# Patient Record
Sex: FEMALE | Race: WHITE | NOT HISPANIC OR LATINO | Employment: FULL TIME | URBAN - METROPOLITAN AREA
[De-identification: names, ages, dates, MRNs, and addresses within clinical notes are randomized per-mention and may not be internally consistent; named-entity substitution may affect disease eponyms.]

---

## 2017-01-25 ENCOUNTER — HOSPITAL ENCOUNTER (OUTPATIENT)
Dept: RADIOLOGY | Facility: HOSPITAL | Age: 72
Discharge: HOME | End: 2017-01-25
Attending: FAMILY MEDICINE
Payer: OTHER MISCELLANEOUS

## 2017-01-25 ENCOUNTER — NURSE TRIAGE (OUTPATIENT)
Dept: ADMINISTRATIVE | Facility: CLINIC | Age: 72
End: 2017-01-25

## 2017-01-25 ENCOUNTER — OFFICE VISIT (OUTPATIENT)
Dept: FAMILY MEDICINE | Facility: CLINIC | Age: 72
End: 2017-01-25
Payer: COMMERCIAL

## 2017-01-25 ENCOUNTER — PATIENT MESSAGE (OUTPATIENT)
Dept: FAMILY MEDICINE | Facility: CLINIC | Age: 72
End: 2017-01-25

## 2017-01-25 VITALS
HEIGHT: 67 IN | TEMPERATURE: 99 F | BODY MASS INDEX: 21.28 KG/M2 | SYSTOLIC BLOOD PRESSURE: 112 MMHG | WEIGHT: 135.56 LBS | HEART RATE: 80 BPM | DIASTOLIC BLOOD PRESSURE: 62 MMHG

## 2017-01-25 DIAGNOSIS — T14.8XXA ABRASION: ICD-10-CM

## 2017-01-25 DIAGNOSIS — T14.90XA TRAUMA: ICD-10-CM

## 2017-01-25 DIAGNOSIS — T14.90XA TRAUMA: Primary | ICD-10-CM

## 2017-01-25 PROCEDURE — 73610 X-RAY EXAM OF ANKLE: CPT | Mod: TC,PO,RT

## 2017-01-25 PROCEDURE — 73630 X-RAY EXAM OF FOOT: CPT | Mod: TC,PO,RT

## 2017-01-25 PROCEDURE — 1125F AMNT PAIN NOTED PAIN PRSNT: CPT | Mod: S$GLB,,, | Performed by: FAMILY MEDICINE

## 2017-01-25 PROCEDURE — 73610 X-RAY EXAM OF ANKLE: CPT | Mod: 26,RT,, | Performed by: RADIOLOGY

## 2017-01-25 PROCEDURE — 99213 OFFICE O/P EST LOW 20 MIN: CPT | Mod: S$GLB,,, | Performed by: FAMILY MEDICINE

## 2017-01-25 PROCEDURE — 1159F MED LIST DOCD IN RCRD: CPT | Mod: S$GLB,,, | Performed by: FAMILY MEDICINE

## 2017-01-25 PROCEDURE — 1160F RVW MEDS BY RX/DR IN RCRD: CPT | Mod: S$GLB,,, | Performed by: FAMILY MEDICINE

## 2017-01-25 PROCEDURE — 73630 X-RAY EXAM OF FOOT: CPT | Mod: 26,RT,, | Performed by: RADIOLOGY

## 2017-01-25 PROCEDURE — 99999 PR PBB SHADOW E&M-EST. PATIENT-LVL III: CPT | Mod: PBBFAC,,, | Performed by: FAMILY MEDICINE

## 2017-01-25 PROCEDURE — 1157F ADVNC CARE PLAN IN RCRD: CPT | Mod: S$GLB,,, | Performed by: FAMILY MEDICINE

## 2017-01-25 RX ORDER — DICLOFENAC SODIUM 50 MG/1
50 TABLET, DELAYED RELEASE ORAL 2 TIMES DAILY PRN
Qty: 20 TABLET | Refills: 0 | Status: SHIPPED | OUTPATIENT
Start: 2017-01-25 | End: 2017-07-20

## 2017-01-25 RX ORDER — MUPIROCIN CALCIUM 20 MG/G
CREAM TOPICAL 2 TIMES DAILY PRN
Qty: 30 G | Refills: 1 | Status: SHIPPED | OUTPATIENT
Start: 2017-01-25 | End: 2017-02-04

## 2017-01-25 NOTE — PATIENT INSTRUCTIONS
RICE     Rest an injury, elevate it, and use ice and compression as directed.   RICE stands for rest, ice, compression, and elevation. These can limit pain and swelling after an injury. RICE may be recommended to help treat fractures, sprains, strains, and bruises or bumps.   Home care  The following explain the details of RICE:  · Rest. Limit the use of the injured body part. This helps prevent further damage to the body part and gives it time to heal. In some cases, you may need a sling, brace, splint, or cast to help keep the body part still until it has healed.  · Ice. Applying ice right after an injury helps relieve pain and swelling. Wrap a bag of ice in a thin towel. Then, place it over the injured area. Do this for 10 to 15 minutes every 3 to 4 hours. Continue for the next 1 to 3 days or until your symptoms improve. Never put ice directly on your skin or ice an area longer than 15 minutes at a time.  · Compression. Putting pressure on an injury helps reduce swelling and provides support. Wrap the injured area firmly with an elastic bandage/wrap. Make sure not to wrap the bandage too tightly or you will cut off blood flow to the injured area. If your bandage loosens, rewrap it.  · Elevation. Keeping an injury raised above the level of your heart reduces swelling, pain, and throbbing. For instance, if you have a broken leg, it may help to rest your leg on several pillows when sitting or lying down. Try to keep the injured area elevated for at least 2 to 3 hours per day.  Follow-up care  Follow up with your healthcare provider, or as advised.  When to seek medical advice  Call your healthcare provider right away if any of these occur:  · Fever of 100.4°F (38°C) or higher, or as directed by your healthcare provider  · Increased pain or swelling in the injured body part  · Injured body part becomes cold, blue, numb, or tingly  · Signs of infection. These include warmth in the skin, redness, drainage, or bad  smell coming from the injured body part.  © 3969-4235 [employer]. [address] [place] [address], [place] [address]. All rights reserved. This information is not intended as a substitute for professional medical care. Always follow your healthcare professional's instructions.

## 2017-01-25 NOTE — MR AVS SNAPSHOT
[facility]  [address] [facility], [address]  [facility] [address]  Phone: [phone]  Fax: [phone]                  La Salgado   2017 9:40 AM   Office Visit    Description:  Female : 1945   Provider:  Sandra Bush MD   Department:  [facility]           Reason for Visit     Foot Injury           Diagnoses this Visit        Comments    Trauma    -  Primary     Abrasion                To Do List           Future Appointments        Provider Department Dept Phone    2017 8:20 AM Deepak Wilson MD [facility] [phone]    2017 10:40 AM Yelitza Mullen MD [place] - Dermatology [phone]      Goals (5 Years of Data)     None       These Medications        Disp Refills Start End    mupirocin calcium 2% (BACTROBAN) 2 % cream 30 g 1 2017    Apply topically 2 (two) times daily as needed. - Topical (Top)    Pharmacy: [facility] [address] [place] [address] [address] [facility] AT [facility] & [address] Ph #: [phone]       diclofenac (VOLTAREN) 50 MG EC tablet 20 tablet 0 2017     Take 1 tablet (50 mg total) by mouth 2 (two) times daily as needed. - Oral    Pharmacy: [facility] [place] [address] [address] [facility] AT [facility] & [address] Ph #: [phone]         [facility] On Call     [facility] On [facility] Nurse Care Line -  Assistance  Registered nurses in the [facility] Center provide clinical advisement, health education, appointment booking, and other advisory services.  Call for this free service at [phone].             Medications           Message regarding Medications     Verify the changes and/or additions to your medication regime listed below are the same as discussed with your clinician today.  If any of these changes or additions are incorrect, please notify your healthcare provider.        START taking these NEW  "medications        Refills    mupirocin calcium 2% (BACTROBAN) 2 % cream 1    Sig: Apply topically 2 (two) times daily as needed.    Class: Normal    Route: Topical (Top)    diclofenac (VOLTAREN) 50 MG EC tablet 0    Sig: Take 1 tablet (50 mg total) by mouth 2 (two) times daily as needed.    Class: Normal    Route: Oral      STOP taking these medications     FLUZONE HIGH-DOSE 2015-16, PF, 180 mcg/0.5 mL Syrg     peg 3350-electrolytes-vit C (MOVIPREP) 100-7.5-2.691 gram PwPk Take 1 packet by mouth as directed.           Verify that the below list of medications is an accurate representation of the medications you are currently taking.  If none reported, the list may be blank. If incorrect, please contact your healthcare provider. Carry this list with you in case of emergency.           Current Medications     diclofenac (VOLTAREN) 50 MG EC tablet Take 1 tablet (50 mg total) by mouth 2 (two) times daily as needed.    mupirocin calcium 2% (BACTROBAN) 2 % cream Apply topically 2 (two) times daily as needed.           Clinical Reference Information           Vital Signs - Last Recorded  Most recent update: 1/25/2017  9:40 AM by Jodie Amaya MA    BP Pulse Temp Ht Wt BMI    112/62 (BP Location: Right arm) 80 98.9 °F (37.2 °C) 5' 7" (1.702 m) 61.5 kg (135 lb 9.3 oz) 21.24 kg/m2      Blood Pressure          Most Recent Value    BP  112/62      Allergies as of 1/25/2017     Amoxicillin    Codeine    Risedronate    Sulfa (Sulfonamide Antibiotics)      Immunizations Administered on Date of Encounter - 1/25/2017     None      Orders Placed During Today's Visit     Future Labs/Procedures Expected by Expires    X-Ray Ankle Complete Right  1/25/2017 1/25/2018    X-Ray Foot Complete Right  1/25/2017 1/25/2018      Instructions      RICE     Rest an injury, elevate it, and use ice and compression as directed.   RICE stands for rest, ice, compression, and elevation. These can limit pain and swelling after an injury. RICE may be " recommended to help treat fractures, sprains, strains, and bruises or bumps.   Home care  The following explain the details of RICE:  · Rest. Limit the use of the injured body part. This helps prevent further damage to the body part and gives it time to heal. In some cases, you may need a sling, brace, splint, or cast to help keep the body part still until it has healed.  · Ice. Applying ice right after an injury helps relieve pain and swelling. Wrap a bag of ice in a thin towel. Then, place it over the injured area. Do this for 10 to 15 minutes every 3 to 4 hours. Continue for the next 1 to 3 days or until your symptoms improve. Never put ice directly on your skin or ice an area longer than 15 minutes at a time.  · Compression. Putting pressure on an injury helps reduce swelling and provides support. Wrap the injured area firmly with an elastic bandage/wrap. Make sure not to wrap the bandage too tightly or you will cut off blood flow to the injured area. If your bandage loosens, rewrap it.  · Elevation. Keeping an injury raised above the level of your heart reduces swelling, pain, and throbbing. For instance, if you have a broken leg, it may help to rest your leg on several pillows when sitting or lying down. Try to keep the injured area elevated for at least 2 to 3 hours per day.  Follow-up care  Follow up with your healthcare provider, or as advised.  When to seek medical advice  Call your healthcare provider right away if any of these occur:  · Fever of 100.4°F (38°C) or higher, or as directed by your healthcare provider  · Increased pain or swelling in the injured body part  · Injured body part becomes cold, blue, numb, or tingly  · Signs of infection. These include warmth in the skin, redness, drainage, or bad smell coming from the injured body part.  © 3532-0183 [employer]. [address] [place] [address], [place] [address]. All rights reserved. This information is not intended as a substitute for  professional medical care. Always follow your healthcare professional's instructions.

## 2017-01-25 NOTE — TELEPHONE ENCOUNTER
Reason for Disposition   [1] Limp when walking AND [2] due to a direct blow or crushing injury    Protocols used: ST FOOT AND ANKLE INJURY-A-    Patient reporting she had a projector from the ceiling at her work fall onto the achilles area of her right foot but today she is stating that she is having a significant amount of pain to the top of that foot and it is painful for her to walk. She states she can walk on it for a little while and then she starts having significant pain to that foot. Advised to be seen today. Scheduled appointment. Please contact caller with any further care advice.

## 2017-01-26 ENCOUNTER — TELEPHONE (OUTPATIENT)
Dept: FAMILY MEDICINE | Facility: CLINIC | Age: 72
End: 2017-01-26

## 2017-01-26 NOTE — TELEPHONE ENCOUNTER
----- Message from Linda Cunningham sent at 1/26/2017  8:23 AM CST -----  Contact: Pt - [phone]  Pt had an xray done at [facility] yesterday on her foot and would like to get the results of that xray.  Please call pt La Salgado @ [phone]

## 2017-01-27 NOTE — PROGRESS NOTES
Subjective:       Patient ID: La Salgado is a 71 y.o. female.    Chief Complaint: Foot Injury (heal right foot)  Projector screen fell out of the ceiling and hit the back of her ankles  Patient suffered abrasions to the back of both heels.  But is now experiencing pain on the dorsum of the   Right foot and ankle area.  Patient reports no other injuries  HPI see above   Review of Systems   Constitutional: Negative.    HENT: Negative.    Eyes: Negative.    Respiratory: Negative.    Cardiovascular: Negative.    Gastrointestinal: Negative.    Endocrine: Negative.    Genitourinary: Negative.    Musculoskeletal: Positive for arthralgias.   Skin: Positive for wound.   Allergic/Immunologic: Negative.    Neurological: Negative.    Hematological: Negative.    Psychiatric/Behavioral: Negative.        Objective:      Physical Exam   Constitutional: She is oriented to person, place, and time. She appears well-developed and well-nourished. No distress.   HENT:   Head: Normocephalic and atraumatic.   Eyes: Conjunctivae and EOM are normal. Pupils are equal, round, and reactive to light.   Musculoskeletal:        Right ankle: She exhibits decreased range of motion, swelling and ecchymosis.        Left ankle: Normal.        Right foot: There is decreased range of motion, tenderness and swelling.        Left foot: Normal.        Feet:    Demarcated areas show areas of point tenderness on exam   Neurological: She is alert and oriented to person, place, and time. She has normal reflexes. She displays normal reflexes. No cranial nerve deficit. She exhibits normal muscle tone. Coordination normal.   Skin: Skin is warm and dry. Abrasion noted. She is not diaphoretic. No pallor.        Markings on the diagram represent superficial abrasions on the back of both heels healing   Psychiatric: She has a normal mood and affect. Her behavior is normal. Judgment and thought content normal.   Nursing note and vitals reviewed.      Assessment:        1. Trauma    2. Abrasion     3.     Foot pain  Plan:      fresh dressings were applied to the wound is scheduled for x-ray to rule out fractures because of trauma  Refer to the med card dated 1/25/2017 we'll contact the patient results of x-rays are available  We'll make further determinations at that time depending on the results.

## 2017-02-01 ENCOUNTER — OFFICE VISIT (OUTPATIENT)
Dept: FAMILY MEDICINE | Facility: CLINIC | Age: 72
End: 2017-02-01
Payer: COMMERCIAL

## 2017-02-01 ENCOUNTER — LAB VISIT (OUTPATIENT)
Dept: LAB | Facility: HOSPITAL | Age: 72
End: 2017-02-01
Attending: FAMILY MEDICINE
Payer: COMMERCIAL

## 2017-02-01 VITALS
HEART RATE: 76 BPM | HEIGHT: 66 IN | TEMPERATURE: 98 F | SYSTOLIC BLOOD PRESSURE: 110 MMHG | BODY MASS INDEX: 21.44 KG/M2 | WEIGHT: 133.38 LBS | DIASTOLIC BLOOD PRESSURE: 64 MMHG

## 2017-02-01 DIAGNOSIS — Z12.31 SCREENING MAMMOGRAM FOR HIGH-RISK PATIENT: ICD-10-CM

## 2017-02-01 DIAGNOSIS — Z00.00 ROUTINE GENERAL MEDICAL EXAMINATION AT A HEALTH CARE FACILITY: ICD-10-CM

## 2017-02-01 DIAGNOSIS — S91.001S ANKLE WOUND, RIGHT, SEQUELA: Primary | ICD-10-CM

## 2017-02-01 DIAGNOSIS — Z13.9 SCREENING: ICD-10-CM

## 2017-02-01 LAB
ALBUMIN SERPL BCP-MCNC: 3.7 G/DL
ALP SERPL-CCNC: 100 U/L
ALT SERPL W/O P-5'-P-CCNC: 22 U/L
ANION GAP SERPL CALC-SCNC: 10 MMOL/L
AST SERPL-CCNC: 26 U/L
BASOPHILS # BLD AUTO: 0.01 K/UL
BASOPHILS NFR BLD: 0.2 %
BILIRUB SERPL-MCNC: 0.7 MG/DL
BILIRUB UR QL STRIP: NEGATIVE
BUN SERPL-MCNC: 21 MG/DL
CALCIUM SERPL-MCNC: 9.2 MG/DL
CHLORIDE SERPL-SCNC: 104 MMOL/L
CHOLEST/HDLC SERPL: 4.8 {RATIO}
CLARITY UR REFRACT.AUTO: CLEAR
CO2 SERPL-SCNC: 24 MMOL/L
COLOR UR AUTO: ABNORMAL
CREAT SERPL-MCNC: 1 MG/DL
DIFFERENTIAL METHOD: NORMAL
EOSINOPHIL # BLD AUTO: 0.1 K/UL
EOSINOPHIL NFR BLD: 1 %
ERYTHROCYTE [DISTWIDTH] IN BLOOD BY AUTOMATED COUNT: 14 %
EST. GFR  (AFRICAN AMERICAN): >60 ML/MIN/1.73 M^2
EST. GFR  (NON AFRICAN AMERICAN): 57.6 ML/MIN/1.73 M^2
GLUCOSE SERPL-MCNC: 79 MG/DL
GLUCOSE UR QL STRIP: NEGATIVE
HCT VFR BLD AUTO: 38.7 %
HDL/CHOLESTEROL RATIO: 21 %
HDLC SERPL-MCNC: 229 MG/DL
HDLC SERPL-MCNC: 48 MG/DL
HGB BLD-MCNC: 12.9 G/DL
HGB UR QL STRIP: NEGATIVE
KETONES UR QL STRIP: NEGATIVE
LDLC SERPL CALC-MCNC: 164.8 MG/DL
LEUKOCYTE ESTERASE UR QL STRIP: NEGATIVE
LYMPHOCYTES # BLD AUTO: 1.3 K/UL
LYMPHOCYTES NFR BLD: 27.6 %
MCH RBC QN AUTO: 27.9 PG
MCHC RBC AUTO-ENTMCNC: 33.3 %
MCV RBC AUTO: 84 FL
MONOCYTES # BLD AUTO: 0.7 K/UL
MONOCYTES NFR BLD: 14.6 %
NEUTROPHILS # BLD AUTO: 2.7 K/UL
NEUTROPHILS NFR BLD: 56.4 %
NITRITE UR QL STRIP: NEGATIVE
NONHDLC SERPL-MCNC: 181 MG/DL
PH UR STRIP: 5 [PH] (ref 5–8)
PLATELET # BLD AUTO: 251 K/UL
PMV BLD AUTO: 9.6 FL
POTASSIUM SERPL-SCNC: 4.5 MMOL/L
PROT SERPL-MCNC: 7.5 G/DL
PROT UR QL STRIP: NEGATIVE
RBC # BLD AUTO: 4.62 M/UL
SODIUM SERPL-SCNC: 138 MMOL/L
SP GR UR STRIP: 1.02 (ref 1–1.03)
TRIGL SERPL-MCNC: 81 MG/DL
TSH SERPL DL<=0.005 MIU/L-ACNC: 2.59 UIU/ML
URN SPEC COLLECT METH UR: ABNORMAL
UROBILINOGEN UR STRIP-ACNC: NEGATIVE EU/DL
WBC # BLD AUTO: 4.78 K/UL

## 2017-02-01 PROCEDURE — 80053 COMPREHEN METABOLIC PANEL: CPT

## 2017-02-01 PROCEDURE — 81003 URINALYSIS AUTO W/O SCOPE: CPT

## 2017-02-01 PROCEDURE — 80061 LIPID PANEL: CPT

## 2017-02-01 PROCEDURE — 99397 PER PM REEVAL EST PAT 65+ YR: CPT | Mod: S$GLB,,, | Performed by: FAMILY MEDICINE

## 2017-02-01 PROCEDURE — 99999 PR PBB SHADOW E&M-EST. PATIENT-LVL III: CPT | Mod: PBBFAC,,, | Performed by: FAMILY MEDICINE

## 2017-02-01 PROCEDURE — 84443 ASSAY THYROID STIM HORMONE: CPT

## 2017-02-01 PROCEDURE — 36415 COLL VENOUS BLD VENIPUNCTURE: CPT | Mod: PO

## 2017-02-01 PROCEDURE — 85025 COMPLETE CBC W/AUTO DIFF WBC: CPT

## 2017-02-01 RX ORDER — CLINDAMYCIN HYDROCHLORIDE 300 MG/1
300 CAPSULE ORAL 3 TIMES DAILY
Qty: 30 CAPSULE | Refills: 0 | Status: SHIPPED | OUTPATIENT
Start: 2017-02-01 | End: 2017-02-11

## 2017-02-01 NOTE — MR AVS SNAPSHOT
[facility]  [address] [facility], [address]  [facility] [address]  Phone: [phone]  Fax: [phone]                  La Salgado   2017 8:20 AM   Office Visit    Description:  Female : 1945   Provider:  Deepak Wilson MD   Department:  [facility]           Reason for Visit     Annual Exam           Diagnoses this Visit        Comments    Ankle wound, right, sequela    -  Primary     Routine general medical examination at a health care facility         Screening mammogram for high-risk patient         Screening                To Do List           Future Appointments        Provider Department Dept Phone    2017 10:40 AM Yelitza Mullen MD [place] - Dermatology [phone]      Goals (5 Years of Data)     None      Follow-Up and Disposition     Return in about 2 days (around 2/3/2017).       These Medications        Disp Refills Start End    clindamycin (CLEOCIN) 300 MG capsule 30 capsule 0 2017    Take 1 capsule (300 mg total) by mouth 3 (three) times daily. - Oral    Pharmacy: [facility] [address] [place] [address] [address] [facility] AT [facility] & [address] Ph #: [phone]         [facility] On Call     [facility] On Call Nurse Care Line -  Assistance  Registered nurses in the [facility] Center provide clinical advisement, health education, appointment booking, and other advisory services.  Call for this free service at [phone].             Medications           Message regarding Medications     Verify the changes and/or additions to your medication regime listed below are the same as discussed with your clinician today.  If any of these changes or additions are incorrect, please notify your healthcare provider.        START taking these NEW medications        Refills    clindamycin (CLEOCIN) 300 MG capsule 0    Sig: Take 1 capsule (300 mg total) by mouth 3 (three) times daily.    Class:  "Normal    Route: Oral           Verify that the below list of medications is an accurate representation of the medications you are currently taking.  If none reported, the list may be blank. If incorrect, please contact your healthcare provider. Carry this list with you in case of emergency.           Current Medications     diclofenac (VOLTAREN) 50 MG EC tablet Take 1 tablet (50 mg total) by mouth 2 (two) times daily as needed.    mupirocin calcium 2% (BACTROBAN) 2 % cream Apply topically 2 (two) times daily as needed.    clindamycin (CLEOCIN) 300 MG capsule Take 1 capsule (300 mg total) by mouth 3 (three) times daily.           Clinical Reference Information           Vital Signs - Last Recorded  Most recent update: 2/1/2017  8:34 AM by Laura Mcneill MA    BP Pulse Temp    110/64 (BP Location: Right arm, Patient Position: Sitting, BP Method: Manual) 76 97.8 °F (36.6 °C) (Oral)    Ht Wt BMI    5' 6.25" (1.683 m) 60.5 kg (133 lb 6.1 oz) 21.37 kg/m2      Blood Pressure          Most Recent Value    BP  110/64      Allergies as of 2/1/2017     Amoxicillin    Codeine    Risedronate    Sulfa (Sulfonamide Antibiotics)      Immunizations Administered on Date of Encounter - 2/1/2017     None      Orders Placed During Today's Visit      Normal Orders This Visit    Ambulatory consult to Wound Clinic     Future Labs/Procedures Expected by Expires    CBC auto differential  2/1/2017 5/2/2017    Comprehensive metabolic panel  2/1/2017 5/2/2017    DXA Bone Density Spine And Hip_Axial Skeleton  2/1/2017 5/2/2017    Lipid panel  2/1/2017 4/2/2018    Mammo Digital Screening Bilat with CAD  2/1/2017 5/2/2017    TSH  2/1/2017 4/2/2018    Urinalysis  2/1/2017 5/2/2017      "

## 2017-02-01 NOTE — PROGRESS NOTES
Subjective:       Patient ID: La Salgado is a 71 y.o. female.    Chief Complaint: Annual Exam    HPI Comments: Disclaimer: This note has been generated using voice-recognition software. There may be typographical errors that have been missed during proof-reading    Here for routine exam, last exam one year ago  Last mammogram:  12/15  Last BMD:  2013  Last colonoscopy: 2016, removal of polypsX2  Immunizations:  UTD    Review of Systems   Constitutional: Negative for activity change, appetite change, chills, fatigue, fever and unexpected weight change.   HENT: Negative for congestion, ear discharge, ear pain, hearing loss and sinus pressure.    Eyes: Negative for pain and visual disturbance.   Respiratory: Negative for cough, chest tightness and shortness of breath.    Cardiovascular: Negative for chest pain, palpitations and leg swelling.   Gastrointestinal: Negative for abdominal distention and abdominal pain.   Genitourinary: Negative for difficulty urinating, dysuria, frequency and hematuria.   Musculoskeletal: Negative for arthralgias, back pain, gait problem, joint swelling, myalgias, neck pain and neck stiffness.   Skin: Positive for color change and wound. Negative for rash.        Recently seen for trauma over right heel/Achilles area.  Now with swelling and redness over site   Neurological: Negative for dizziness, tremors, speech difficulty, weakness, numbness and headaches.   Psychiatric/Behavioral: Negative for agitation and behavioral problems.       Objective:      Physical Exam   Constitutional: She is oriented to person, place, and time. She appears well-developed and well-nourished.   HENT:   Right Ear: Tympanic membrane and external ear normal.   Left Ear: Tympanic membrane and external ear normal.   Nose: Nose normal.   Mouth/Throat: Oropharynx is clear and moist.   Eyes: Conjunctivae and EOM are normal. Pupils are equal, round, and reactive to light.   Neck: Normal range of motion. Neck  supple. No tracheal deviation present. No thyromegaly present.   Cardiovascular: Normal rate, regular rhythm, normal heart sounds and intact distal pulses.    Pulmonary/Chest: Effort normal. She has no wheezes. She has no rales. She exhibits no tenderness.   Abdominal: Soft. Bowel sounds are normal. She exhibits no distension and no mass. There is no rebound.   Genitourinary:   Genitourinary Comments: Breasts show no masses or nipple retraction, no axillary adenopathy     Musculoskeletal: Normal range of motion. She exhibits no edema or tenderness.   Lymphadenopathy:     She has no cervical adenopathy.   Neurological: She is alert and oriented to person, place, and time. She has normal reflexes. No cranial nerve deficit. Coordination normal.   Skin: Skin is warm and dry. No rash noted. No erythema.   Right ankle exam shows a 2cm ulcerated lesion over lateral aspect of ankle above lateral malleolus.  Large area of erythema/induration measuring about 5x8cm no fluctuance   Psychiatric: She has a normal mood and affect. Her behavior is normal.       Assessment:       1.  Physical exam  2.  Cellulitis, ulcer right ankle  Plan:       1.  Fasting labs, mammogram, UA, BMD  2.  Clindamycin 300mg tid with follow up in 2 days  3.  Consult Wound Clinic

## 2017-02-02 ENCOUNTER — PATIENT MESSAGE (OUTPATIENT)
Dept: FAMILY MEDICINE | Facility: CLINIC | Age: 72
End: 2017-02-02

## 2017-02-03 ENCOUNTER — OFFICE VISIT (OUTPATIENT)
Dept: FAMILY MEDICINE | Facility: CLINIC | Age: 72
End: 2017-02-03
Payer: COMMERCIAL

## 2017-02-03 VITALS — TEMPERATURE: 99 F | HEART RATE: 84 BPM | DIASTOLIC BLOOD PRESSURE: 70 MMHG | SYSTOLIC BLOOD PRESSURE: 100 MMHG

## 2017-02-03 DIAGNOSIS — L03.90 WOUND CELLULITIS: Primary | ICD-10-CM

## 2017-02-03 PROCEDURE — 99214 OFFICE O/P EST MOD 30 MIN: CPT | Mod: S$GLB,,, | Performed by: FAMILY MEDICINE

## 2017-02-03 PROCEDURE — 1125F AMNT PAIN NOTED PAIN PRSNT: CPT | Mod: S$GLB,,, | Performed by: FAMILY MEDICINE

## 2017-02-03 PROCEDURE — 1160F RVW MEDS BY RX/DR IN RCRD: CPT | Mod: S$GLB,,, | Performed by: FAMILY MEDICINE

## 2017-02-03 PROCEDURE — 1157F ADVNC CARE PLAN IN RCRD: CPT | Mod: S$GLB,,, | Performed by: FAMILY MEDICINE

## 2017-02-03 PROCEDURE — 99999 PR PBB SHADOW E&M-EST. PATIENT-LVL III: CPT | Mod: PBBFAC,,, | Performed by: FAMILY MEDICINE

## 2017-02-03 PROCEDURE — 1159F MED LIST DOCD IN RCRD: CPT | Mod: S$GLB,,, | Performed by: FAMILY MEDICINE

## 2017-02-03 RX ORDER — CHLORHEXIDINE GLUCONATE 40 MG/ML
SOLUTION TOPICAL DAILY PRN
Qty: 236 ML | Refills: 2 | COMMUNITY
Start: 2017-02-03 | End: 2017-07-20

## 2017-02-03 NOTE — PROGRESS NOTES
Subjective:       Patient ID: La Salgado is a 71 y.o. female.    Chief Complaint: Wound Check (right foot)  pt suffered a large abrasion to the back of her right heel that resulted in cellulitis  She has been applying clean bandaging and taking po antibiotics for the wound  She is in for a wound check today.  HPI see above  Review of Systems   Constitutional: Negative.    HENT: Negative.    Eyes: Negative.    Respiratory: Negative.    Cardiovascular: Negative.    Gastrointestinal: Negative.    Endocrine: Negative.    Genitourinary: Negative.    Musculoskeletal: Positive for arthralgias.   Skin: Positive for color change and wound.   Allergic/Immunologic: Negative.    Neurological: Negative.    Hematological: Negative.    Psychiatric/Behavioral: Negative.        Objective:      Physical Exam   Constitutional: She is oriented to person, place, and time.   HENT:   Head: Normocephalic and atraumatic.   Eyes: Conjunctivae and EOM are normal. Pupils are equal, round, and reactive to light.   Neck: Normal range of motion. Neck supple. No thyromegaly present.   Cardiovascular: Normal rate and regular rhythm.    Pulmonary/Chest: Effort normal and breath sounds normal.   Neurological: She is alert and oriented to person, place, and time. She has normal reflexes.   Skin: Skin is warm and dry. Bruising noted. There is erythema.        Pt 's erythema is clearly inside the black margins.  Wound is healing well.   Psychiatric: She has a normal mood and affect. Her behavior is normal. Judgment and thought content normal.   Nursing note and vitals reviewed.      Assessment:       1. Wound cellulitis    2.      Good wound healing  Plan:     dressing change performed with telfa and occlusive bandage.  Pt will continue same antibiotics which appear to be helping the cellulitis.  Pt will f/u with wound care nurse as scheduled. Pt will call if wound starts to   Get soupy or more erythema

## 2017-02-03 NOTE — MR AVS SNAPSHOT
[facility]  [address] [facility], [address]  [facility] [address]  Phone: [phone]  Fax: [phone]                  La Salgado   2/3/2017 9:20 AM   Office Visit    Description:  Female : 1945   Provider:  Sandra Bush MD   Department:  [facility]           Reason for Visit     Wound Check           Diagnoses this Visit        Comments    Wound cellulitis    -  Primary            To Do List           Future Appointments        Provider Department Dept Phone    2017 1:00 PM Christianne Perez, [facility] W[facility] Care [phone]    2017 2:00 PM METH MAMMO1 [facility] [phone]    2017 3:00 PM METC, DEXA1 [facility] [phone]    2017 10:40 AM Yelitza Mullen MD [facility] Dermatology [phone]      Goals (5 Years of Data)     None      PURCHASE these Medications (No prescription required)        Start End    chlorhexidine (HIBICLENS) 4 % external liquid 2/3/2017     Sig - Route: Apply topically daily as needed. - Topical (Top)    Class: OTC      [facility]     [facility] -  Assistance  Registered nurses in the [facility] provide clinical advisement, health education, appointment booking, and other advisory services.  Call for this free service at [phone].             Medications           Message regarding Medications     Verify the changes and/or additions to your medication regime listed below are the same as discussed with your clinician today.  If any of these changes or additions are incorrect, please notify your healthcare provider.        START taking these NEW medications        Refills    chlorhexidine (HIBICLENS) 4 % external liquid 2    Sig: Apply topically daily as needed.    Class: OTC    Route: Topical (Top)           Verify that the below list of medications is an accurate representation of the medications you are  currently taking.  If none reported, the list may be blank. If incorrect, please contact your healthcare provider. Carry this list with you in case of emergency.           Current Medications     chlorhexidine (HIBICLENS) 4 % external liquid Apply topically daily as needed.    clindamycin (CLEOCIN) 300 MG capsule Take 1 capsule (300 mg total) by mouth 3 (three) times daily.    diclofenac (VOLTAREN) 50 MG EC tablet Take 1 tablet (50 mg total) by mouth 2 (two) times daily as needed.    mupirocin calcium 2% (BACTROBAN) 2 % cream Apply topically 2 (two) times daily as needed.           Clinical Reference Information           Your Vitals Were     BP Pulse Temp             100/70 (BP Location: Left arm) 84 99.2 °F (37.3 °C)         Blood Pressure          Most Recent Value    BP  100/70      Allergies as of 2/3/2017     Amoxicillin    Codeine    Risedronate    Sulfa (Sulfonamide Antibiotics)      Immunizations Administered on Date of Encounter - 2/3/2017     None      Language Assistance Services     ATTENTION: Language assistance services are available, free of charge. Please call [phone].      ATENCIÓN: Si habla opal, tiene a tapia disposición servicios gratuitos de asistencia lingüística. Llame al [phone].     KIMBERLY Ý: N?u b?n nói Ti?ng Vi?t, có các d?ch v? h? tr? ngôn ng? mi?n phí dành cho b?n. G?i s? [phone].         [facility] complies with applicable Federal civil rights laws and does not discriminate on the basis of race, color, national origin, age, disability, or sex.

## 2017-02-07 ENCOUNTER — OFFICE VISIT (OUTPATIENT)
Dept: WOUND CARE | Facility: CLINIC | Age: 72
End: 2017-02-07
Payer: COMMERCIAL

## 2017-02-07 VITALS
HEART RATE: 68 BPM | TEMPERATURE: 98 F | SYSTOLIC BLOOD PRESSURE: 103 MMHG | BODY MASS INDEX: 21.67 KG/M2 | DIASTOLIC BLOOD PRESSURE: 53 MMHG | HEIGHT: 66 IN | WEIGHT: 134.81 LBS

## 2017-02-07 DIAGNOSIS — S81.801A OPEN WOUND OF LOWER LEG WITH COMPLICATION, RIGHT, INITIAL ENCOUNTER: ICD-10-CM

## 2017-02-07 PROBLEM — S81.809A OPEN WOUND OF LOWER LEG WITH COMPLICATION: Status: ACTIVE | Noted: 2017-02-07

## 2017-02-07 PROCEDURE — 99999 PR PBB SHADOW E&M-EST. PATIENT-LVL III: CPT | Mod: PBBFAC,,, | Performed by: NURSE PRACTITIONER

## 2017-02-07 PROCEDURE — 1160F RVW MEDS BY RX/DR IN RCRD: CPT | Mod: S$GLB,ICN,, | Performed by: NURSE PRACTITIONER

## 2017-02-07 PROCEDURE — 1159F MED LIST DOCD IN RCRD: CPT | Mod: S$GLB,ICN,, | Performed by: NURSE PRACTITIONER

## 2017-02-07 PROCEDURE — 1157F ADVNC CARE PLAN IN RCRD: CPT | Mod: S$GLB,ICN,, | Performed by: NURSE PRACTITIONER

## 2017-02-07 PROCEDURE — 99203 OFFICE O/P NEW LOW 30 MIN: CPT | Mod: S$GLB,ICN,, | Performed by: NURSE PRACTITIONER

## 2017-02-07 PROCEDURE — 1126F AMNT PAIN NOTED NONE PRSNT: CPT | Mod: S$GLB,ICN,, | Performed by: NURSE PRACTITIONER

## 2017-02-07 NOTE — LETTER
February 7, 2017      Deepak Wilson MD  [address] [facility]  [address]  [facility] [address]           [facility]  [address]  [facility] [address]  Phone: [phone]          Patient: La Salgado   MR Number: [id number]   YOB: 1945   Date of Visit: 2/7/2017       Dear Dr. Deepak Wilson:    Thank you for referring La Salgado to me for evaluation. Attached you will find relevant portions of my assessment and plan of care.    If you have questions, please do not hesitate to call me. I look forward to following La Salgado along with you.    Sincerely,    Christianne Perez, JACINTO    Enclosure  CC:  No Recipients    If you would like to receive this communication electronically, please contact [email or web address] or [phone] to request more information on No [employer] Just V[employer] Link access.    For providers and/or their staff who would like to refer a patient to [facility], please contact us through our one-stop-shop provider referral line, [facility], at [phone].    If you feel you have received this communication in error or would no longer like to receive these types of communications, please e-mail [email or web address]

## 2017-02-07 NOTE — PROGRESS NOTES
Subjective:       Patient ID: La Salgado is a 71 y.o. female.    Chief Complaint: Wound Check    HPI   This is a 71 year old female referred by Dr. Wilson for evaluation and management of a traumatic wound to the right heel.  She is a professor at [employer].  Two weeks ago she was pulling down an AV screen for a presentation when it fell off the wall incurring injury to the right posterior heel.  She is afebrile. She is on clindamycin orally and using bactroban on the wound.  The redness is improved but the wound does not seem to be healing.  She does not complain of pain.  Review of Systems   Constitutional: Negative for chills, diaphoresis and fever.   HENT: Negative for hearing loss, postnasal drip, rhinorrhea, sinus pressure, sneezing, sore throat, tinnitus and trouble swallowing.    Eyes: Negative for visual disturbance.   Respiratory: Negative for apnea, cough, shortness of breath and wheezing.    Cardiovascular: Positive for leg swelling. Negative for chest pain and palpitations.   Gastrointestinal: Negative for constipation, diarrhea, nausea and vomiting.   Genitourinary: Negative for difficulty urinating, dysuria, frequency and hematuria.   Musculoskeletal: Negative for arthralgias, back pain and joint swelling.   Skin: Positive for wound.   Neurological: Negative for dizziness, weakness, light-headedness and headaches.   Hematological: Does not bruise/bleed easily.   Psychiatric/Behavioral: Positive for sleep disturbance. Negative for confusion, decreased concentration and dysphoric mood. The patient is not nervous/anxious.        Objective:      Physical Exam   Constitutional: She is oriented to person, place, and time. She appears well-developed and well-nourished. No distress.   HENT:   Head: Normocephalic and atraumatic.   Mouth/Throat: Oropharynx is clear and moist.   Eyes: Conjunctivae and EOM are normal. Pupils are equal, round, and reactive to light. Right eye exhibits no discharge. Left eye  exhibits no discharge. No scleral icterus.   Neck: Normal range of motion. Neck supple. No JVD present. No tracheal deviation present. No thyromegaly present.   Cardiovascular: Normal rate, regular rhythm, normal heart sounds and intact distal pulses.  Exam reveals no gallop and no friction rub.    No murmur heard.  Pulmonary/Chest: Effort normal and breath sounds normal. No respiratory distress. She has no wheezes. She has no rales.   Abdominal: Soft. Bowel sounds are normal. She exhibits no distension. There is no tenderness.   Musculoskeletal: Normal range of motion. She exhibits edema (1-2+ right lower leg). She exhibits no tenderness.        Feet:    Neurological: She is alert and oriented to person, place, and time.   Skin: Skin is warm and dry. No rash noted. She is not diaphoretic. There is erythema (Right lower leg).   Psychiatric: She has a normal mood and affect. Her behavior is normal. Judgment and thought content normal.   Nursing note and vitals reviewed.      Assessment:       1. Open wound of lower leg with complication, right, initial encounter        Plan:           Medihoney gel daily to right posterior leg, cover with gauze and secure with roll gauze.  Return to clinic in 2 weeks.

## 2017-02-07 NOTE — PATIENT INSTRUCTIONS
Wound Care  Taking proper care of your wound will help it heal. Your healthcare provider may show you how to clean and dress the wound. He or she will also explain how to tell if the wound is healing normally. Here are the basic steps:      A wound that's not healing normally may be dark in color or have white streaks.    Wash Your Hands  · Use liquid soap and lather for 2 minutes. Scrub between your fingers and under your nails.  · Rinse with warm water, keeping your fingers pointing down.  · Use a paper towel to dry your hands and to turn off the faucet.  Remove the Used Dressing  · Set up your supplies.  · Put on disposable gloves if youre dressing a wound for someone else or your wound is infected.  · Gently take off the old dressing. If you have a drain or tube in the wound, be careful not to pull on it.  · Loosen the tape by pulling gently toward the wound.  · Remove the dressing one layer at a time and put it in a plastic bag.  · Remove your gloves.  Inspect and Dress the Wound  · Each time you change the dressing, inspect the wound carefully to be sure its healing normally.  · Wash your hands again. Put on a new pair of gloves if youre dressing a wound for someone else or if your wound is infected.  · Clean and dress the wound as directed by your doctor or nurse. If you have a drain or tube, be careful not to pull on it.  · Put all supplies in a plastic bag; seal the bag and put it in the trash.  · Be sure to wash your hands again.  Call your healthcare provider if you see any of the following signs of a problem:   · Bleeding that soaks the dressing  · Pink fluid weeping from the wound  · Increased drainage or drainage that is yellow, yellow-green, or foul-smelling  · Increased swelling or pain, or redness or swelling in the skin around the wound  · A change in the color of the wound  · An increase in the size of the wound  · A fever over 101.0°F, increased fatigue, or a loss of appetite.       ©  0528-1464 [facility], [address] [place] [address], [place] [address]. All rights reserved. This information is not intended as a substitute for professional medical care. Always follow your healthcare professional's instructions.      You may shower using a mild soap such as Dove.  Irrigate the wound with lukewarm water for 5 minutes and dry thoroughly.  Apply medihoney gel to the wound, cover with cotton gauze and secure with roll gauze.  Change dressing daily.  Report any signs of infection.    You may purchase your supplies including the medihoney gel from [employer].  They are located at [address] [place] [address].  Their telephone number is [phone].

## 2017-02-07 NOTE — MR AVS SNAPSHOT
[facility]  [address]  [facility] [address]  Phone: [phone]                  La Salgado   2017 1:00 PM   Office Visit    Description:  Female : 1945   Provider:  Christianne Perez NP   Department:  [facility]           Reason for Visit     Wound Check           Diagnoses this Visit        Comments    Open wound of lower leg with complication, right, initial encounter                To Do List           Future Appointments        Provider Department Dept Phone    2017 2:00 PM METH MAMMO1 [facility] [phone]    2017 3:00 PM METC, DEXA1 [facility] [phone]    3/22/2017 1:30 PM Yelitza Mullen MD [facility] Dermatology [phone]      Goals (5 Years of Data)     None      Follow-Up and Disposition     Return in about 2 weeks (around 2017) for Wound evaluation.      [facility]     [facility] -  Assistance  Registered nurses in the [facility] provide clinical advisement, health education, appointment booking, and other advisory services.  Call for this free service at [phone].             Medications           Message regarding Medications     Verify the changes and/or additions to your medication regime listed below are the same as discussed with your clinician today.  If any of these changes or additions are incorrect, please notify your healthcare provider.             Verify that the below list of medications is an accurate representation of the medications you are currently taking.  If none reported, the list may be blank. If incorrect, please contact your healthcare provider. Carry this list with you in case of emergency.           Current Medications     chlorhexidine (HIBICLENS) 4 % external liquid Apply topically daily as needed.    clindamycin (CLEOCIN) 300 MG capsule Take 1 capsule (300 mg total) by mouth 3 (three) times daily.    diclofenac  "(VOLTAREN) 50 MG EC tablet Take 1 tablet (50 mg total) by mouth 2 (two) times daily as needed.           Clinical Reference Information           Your Vitals Were     BP Pulse Temp Height Weight BMI    103/53 68 97.8 °F (36.6 °C) (Oral) 5' 6.25" (1.683 m) 61.1 kg (134 lb 12.8 oz) 21.59 kg/m2      Blood Pressure          Most Recent Value    BP  (!)  103/53      Allergies as of 2/7/2017     Amoxicillin    Codeine    Risedronate    Sulfa (Sulfonamide Antibiotics)      Immunizations Administered on Date of Encounter - 2/7/2017     None      Instructions    Wound Care  Taking proper care of your wound will help it heal. Your healthcare provider may show you how to clean and dress the wound. He or she will also explain how to tell if the wound is healing normally. Here are the basic steps:      A wound that's not healing normally may be dark in color or have white streaks.    Wash Your Hands  · Use liquid soap and lather for 2 minutes. Scrub between your fingers and under your nails.  · Rinse with warm water, keeping your fingers pointing down.  · Use a paper towel to dry your hands and to turn off the faucet.  Remove the Used Dressing  · Set up your supplies.  · Put on disposable gloves if youre dressing a wound for someone else or your wound is infected.  · Gently take off the old dressing. If you have a drain or tube in the wound, be careful not to pull on it.  · Loosen the tape by pulling gently toward the wound.  · Remove the dressing one layer at a time and put it in a plastic bag.  · Remove your gloves.  Inspect and Dress the Wound  · Each time you change the dressing, inspect the wound carefully to be sure its healing normally.  · Wash your hands again. Put on a new pair of gloves if youre dressing a wound for someone else or if your wound is infected.  · Clean and dress the wound as directed by your doctor or nurse. If you have a drain or tube, be careful not to pull on it.  · Put all supplies in a plastic " bag; seal the bag and put it in the trash.  · Be sure to wash your hands again.  Call your healthcare provider if you see any of the following signs of a problem:   · Bleeding that soaks the dressing  · Pink fluid weeping from the wound  · Increased drainage or drainage that is yellow, yellow-green, or foul-smelling  · Increased swelling or pain, or redness or swelling in the skin around the wound  · A change in the color of the wound  · An increase in the size of the wound  · A fever over 101.0°F, increased fatigue, or a loss of appetite.       © 8428-2545 [facility], [address] [place] [address], [place] [address]. All rights reserved. This information is not intended as a substitute for professional medical care. Always follow your healthcare professional's instructions.      You may shower using a mild soap such as Dove.  Irrigate the wound with lukewarm water for 5 minutes and dry thoroughly.  Apply medihoney gel to the wound, cover with cotton gauze and secure with roll gauze.  Change dressing daily.  Report any signs of infection.    You may purchase your supplies including the medihoney gel from [employer].  They are located at [address] [place] [address] in [place].  Their telephone number is [phone].       Language Assistance Services     ATTENTION: Language assistance services are available, free of charge. Please call [phone].      ATENCIÓN: Si habla opal, tiene a tapia disposición servicios gratuitos de asistencia lingüística. Llame al [phone].     CHÚ Ý: N?u b?n nói Ti?ng Vi?t, có các d?ch v? h? tr? ngôn ng? mi?n phí dành cho b?n. G?i s? [phone].         [facility] complies with applicable Federal civil rights laws and does not discriminate on the basis of race, color, national origin, age, disability, or sex.

## 2017-02-09 ENCOUNTER — HOSPITAL ENCOUNTER (OUTPATIENT)
Dept: RADIOLOGY | Facility: HOSPITAL | Age: 72
Discharge: HOME | End: 2017-02-09
Attending: FAMILY MEDICINE
Payer: COMMERCIAL

## 2017-02-09 DIAGNOSIS — Z12.31 SCREENING MAMMOGRAM FOR HIGH-RISK PATIENT: ICD-10-CM

## 2017-02-09 PROCEDURE — 77067 SCR MAMMO BI INCL CAD: CPT | Mod: TC

## 2017-02-09 PROCEDURE — 77067 SCR MAMMO BI INCL CAD: CPT | Mod: 26,,, | Performed by: RADIOLOGY

## 2017-02-09 PROCEDURE — 77063 BREAST TOMOSYNTHESIS BI: CPT | Mod: 26,,, | Performed by: RADIOLOGY

## 2017-02-23 ENCOUNTER — OFFICE VISIT (OUTPATIENT)
Dept: WOUND CARE | Facility: CLINIC | Age: 72
End: 2017-02-23
Payer: OTHER MISCELLANEOUS

## 2017-02-23 VITALS
TEMPERATURE: 98 F | WEIGHT: 136.38 LBS | SYSTOLIC BLOOD PRESSURE: 105 MMHG | HEIGHT: 68 IN | DIASTOLIC BLOOD PRESSURE: 64 MMHG | BODY MASS INDEX: 20.67 KG/M2 | HEART RATE: 69 BPM

## 2017-02-23 DIAGNOSIS — S81.801A OPEN WOUND OF LOWER LEG WITH COMPLICATION, RIGHT, INITIAL ENCOUNTER: Primary | ICD-10-CM

## 2017-02-23 PROCEDURE — 99999 PR PBB SHADOW E&M-EST. PATIENT-LVL III: CPT | Mod: PBBFAC,,, | Performed by: NURSE PRACTITIONER

## 2017-02-23 PROCEDURE — 99499 UNLISTED E&M SERVICE: CPT | Mod: S$GLB,,, | Performed by: NURSE PRACTITIONER

## 2017-02-23 PROCEDURE — 11042 DBRDMT SUBQ TIS 1ST 20SQCM/<: CPT | Mod: RT,S$GLB,, | Performed by: NURSE PRACTITIONER

## 2017-02-23 NOTE — PROGRESS NOTES
Subjective:       Patient ID: La Salgado is a 72 y.o. female.    Chief Complaint: Wound Check    Wound Check        This patient is seen today for reevaluation of a traumatic wound to the right heel.  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  She is afebrile. She is using medihoney gel daily on the wound.  She denies increased redness, swelling or purulent drainage.  Her pain level is 3/10.  Review of Systems   Constitutional: Negative for chills, diaphoresis and fever.   HENT: Negative for hearing loss, postnasal drip, rhinorrhea, sinus pressure, sneezing, sore throat, tinnitus and trouble swallowing.    Eyes: Negative for visual disturbance.   Respiratory: Negative for apnea, cough, shortness of breath and wheezing.    Cardiovascular: Positive for leg swelling. Negative for chest pain and palpitations.   Gastrointestinal: Negative for constipation, diarrhea, nausea and vomiting.   Genitourinary: Negative for difficulty urinating, dysuria, frequency and hematuria.   Musculoskeletal: Negative for arthralgias, back pain and joint swelling.   Skin: Positive for wound.   Neurological: Negative for dizziness, weakness, light-headedness and headaches.   Hematological: Does not bruise/bleed easily.   Psychiatric/Behavioral: Positive for sleep disturbance. Negative for confusion, decreased concentration and dysphoric mood. The patient is not nervous/anxious.        Objective:      Physical Exam   Constitutional: She is oriented to person, place, and time. She appears well-developed and well-nourished. No distress.   HENT:   Head: Normocephalic and atraumatic.   Neck: Normal range of motion.   Cardiovascular: Intact distal pulses.    Pulmonary/Chest: Effort normal. No respiratory distress.   Musculoskeletal: Normal range of motion. She exhibits edema (1-2+ right lower leg). She exhibits no tenderness.         Feet:    Neurological: She is alert and oriented to person, place, and time.   Skin: Skin is warm and dry. No rash noted. She is not diaphoretic. There is erythema (Right lower leg).   Psychiatric: She has a normal mood and affect. Her behavior is normal. Judgment and thought content normal.   Nursing note and vitals reviewed.      Procedure: La was seen in the clinic room and placed in the supine position on the treatment table. Attention was directed to the wound which was located on the right posterior leg, where an wound measuring 0.6x1.0x0.1 cm is noted. The wound was covered with 50% fibrin. No acute signs of infection were noted. The wound was non-tender. The wound was prepped with alcohol. Utilizing a scissors and pickups, I debrided the wound full-thickness through skin and subcutaneous tissue to excise viable and nonviable tissue inside the wound margins. The patient tolerated well. Because of a lack of sensation, anesthesia was not necessary. The wound was flushed with wound . There was minimal bleeding with debridement which was well controlled with direct pressure. The wound was then dressed with medihoney gel. The patient tolerated the procedure well.  Assessment:       1. Open wound of lower leg with complication, right, initial encounter        Plan:           Medihoney gel daily to right posterior leg, cover with gauze and secure with roll gauze.  Return to clinic in 2 weeks.

## 2017-02-23 NOTE — MR AVS SNAPSHOT
"    [facility]  [address]  [facility] [address]  Phone: [phone]                  La Salgado   2017 8:40 AM   Office Visit    Description:  Female : 1945   Provider:  Christianne Perez NP   Department:  [facility]           Reason for Visit     Wound Check           Diagnoses this Visit        Comments    Open wound of lower leg with complication, right, initial encounter    -  Primary            To Do List           Future Appointments        Provider Department Dept Phone    3/22/2017 1:30 PM Yelitza Mullen MD [facility] Dermatology [phone]      Goals (5 Years of Data)     None      Follow-Up and Disposition     Return in about 2 weeks (around 3/9/2017) for Wound evaluation.      [facility]     [facility] Care [facility] -  Assistance  Registered nurses in the [facility] On Call Center provide clinical advisement, health education, appointment booking, and other advisory services.  Call for this free service at [phone].             Medications           Message regarding Medications     Verify the changes and/or additions to your medication regime listed below are the same as discussed with your clinician today.  If any of these changes or additions are incorrect, please notify your healthcare provider.             Verify that the below list of medications is an accurate representation of the medications you are currently taking.  If none reported, the list may be blank. If incorrect, please contact your healthcare provider. Carry this list with you in case of emergency.           Current Medications     chlorhexidine (HIBICLENS) 4 % external liquid Apply topically daily as needed.    diclofenac (VOLTAREN) 50 MG EC tablet Take 1 tablet (50 mg total) by mouth 2 (two) times daily as needed.           Clinical Reference Information           Your Vitals Were     BP Pulse Temp Height Weight BMI    105/64 69 98.3 °F (36.8 °C) (Oral) 5' 8" " (1.727 m) 61.9 kg (136 lb 6.4 oz) 20.74 kg/m2      Blood Pressure          Most Recent Value    BP  105/64      Allergies as of 2/23/2017     Amoxicillin    Codeine    Risedronate    Sulfa (Sulfonamide Antibiotics)      Immunizations Administered on Date of Encounter - 2/23/2017     None      Instructions    Medihoney gel daily to right posterior leg, cover with gauze and secure with roll gauze.         Language Assistance Services     ATTENTION: Language assistance services are available, free of charge. Please call [phone].      ATENCIÓN: Si habla español, tiene a tapia disposición servicios gratuitos de asistencia lingüística. Llame al [phone].     CHÚ Ý: N?u b?n nói Ti?ng Vi?t, có các d?ch v? h? tr? ngôn ng? mi?n phí dành cho b?n. G?i s? [phone].         [facility] complies with applicable Federal civil rights laws and does not discriminate on the basis of race, color, national origin, age, disability, or sex.

## 2017-02-28 ENCOUNTER — PATIENT MESSAGE (OUTPATIENT)
Dept: INTERNAL MEDICINE | Facility: CLINIC | Age: 72
End: 2017-02-28

## 2017-03-01 ENCOUNTER — PATIENT MESSAGE (OUTPATIENT)
Dept: INTERNAL MEDICINE | Facility: CLINIC | Age: 72
End: 2017-03-01

## 2017-03-01 NOTE — TELEPHONE ENCOUNTER
Hi, please call her --  I think she should be seen for the dizziness. Please ask when she will be back in [place] and offer her an urgent care visit with me or someone else.  Thank you, Mason Flowers

## 2017-03-03 ENCOUNTER — PATIENT MESSAGE (OUTPATIENT)
Dept: INTERNAL MEDICINE | Facility: CLINIC | Age: 72
End: 2017-03-03

## 2017-03-06 ENCOUNTER — OFFICE VISIT (OUTPATIENT)
Dept: INTERNAL MEDICINE | Facility: CLINIC | Age: 72
End: 2017-03-06
Attending: INTERNAL MEDICINE
Payer: OTHER MISCELLANEOUS

## 2017-03-06 VITALS
WEIGHT: 134.69 LBS | HEART RATE: 72 BPM | TEMPERATURE: 98 F | SYSTOLIC BLOOD PRESSURE: 117 MMHG | HEIGHT: 68 IN | BODY MASS INDEX: 20.41 KG/M2 | DIASTOLIC BLOOD PRESSURE: 58 MMHG

## 2017-03-06 DIAGNOSIS — H91.90 HEARING LOSS, UNSPECIFIED HEARING LOSS TYPE, UNSPECIFIED LATERALITY: ICD-10-CM

## 2017-03-06 DIAGNOSIS — S81.801A OPEN WOUND OF LOWER LEG WITH COMPLICATION, RIGHT, INITIAL ENCOUNTER: ICD-10-CM

## 2017-03-06 DIAGNOSIS — R42 DIZZINESS: Primary | ICD-10-CM

## 2017-03-06 PROCEDURE — 1157F ADVNC CARE PLAN IN RCRD: CPT | Mod: S$GLB,,, | Performed by: INTERNAL MEDICINE

## 2017-03-06 PROCEDURE — 99999 PR PBB SHADOW E&M-EST. PATIENT-LVL III: CPT | Mod: PBBFAC,,, | Performed by: INTERNAL MEDICINE

## 2017-03-06 PROCEDURE — 1159F MED LIST DOCD IN RCRD: CPT | Mod: S$GLB,,, | Performed by: INTERNAL MEDICINE

## 2017-03-06 PROCEDURE — 1160F RVW MEDS BY RX/DR IN RCRD: CPT | Mod: S$GLB,,, | Performed by: INTERNAL MEDICINE

## 2017-03-06 PROCEDURE — 99213 OFFICE O/P EST LOW 20 MIN: CPT | Mod: S$GLB,,, | Performed by: INTERNAL MEDICINE

## 2017-03-06 NOTE — MR AVS SNAPSHOT
[facility]  [address]  [place] [address]  Phone: [phone]  Fax: [phone]                  La Salgado   3/6/2017 8:40 AM   Office Visit    Description:  Female : 1945   Provider:  Mason Flowers MD   Department:  [facility]           Reason for Visit     Follow-up           Diagnoses this Visit        Comments    Hearing loss, unspecified hearing loss type, unspecified laterality    -  Primary            To Do List           Future Appointments        Provider Department Dept Phone    3/9/2017 8:40 AM JACINTO Alicia [facility] - Wound Care [phone]    3/22/2017 1:30 PM Yelitza Mullen MD [place] [facility] - Dermatology [phone]      Goals (5 Years of Data)     None      Follow-Up and Disposition     Return in about 1 year (around 3/6/2018).      [facility]     [facility] Line - 24/7 Assistance  Registered nurses in the [facility] provide clinical advisement, health education, appointment booking, and other advisory services.  Call for this free service at [phone].             Medications           Message regarding Medications     Verify the changes and/or additions to your medication regime listed below are the same as discussed with your clinician today.  If any of these changes or additions are incorrect, please notify your healthcare provider.             Verify that the below list of medications is an accurate representation of the medications you are currently taking.  If none reported, the list may be blank. If incorrect, please contact your healthcare provider. Carry this list with you in case of emergency.           Current Medications     chlorhexidine (HIBICLENS) 4 % external liquid Apply topically daily as needed.    diclofenac (VOLTAREN) 50 MG EC tablet Take 1 tablet (50 mg total) by mouth 2 (two) times daily as needed.           Clinical Reference Information           Your Vitals Were   "   BP Pulse Temp Height Weight BMI    117/58 (BP Location: Left arm, Patient Position: Sitting, BP Method: Automatic) 72 98 °F (36.7 °C) (Oral) 5' 8" (1.727 m) 61.1 kg (134 lb 11.2 oz) 20.48 kg/m2      Blood Pressure          Most Recent Value    BP  (!)  117/58      Allergies as of 3/6/2017     Amoxicillin    Codeine    Risedronate    Sulfa (Sulfonamide Antibiotics)      Immunizations Administered on Date of Encounter - 3/6/2017     None      Orders Placed During Today's Visit      Normal Orders This Visit    Ambulatory Referral to ENT       Language Assistance Services     ATTENTION: Language assistance services are available, free of charge. Please call [phone].      ATENCIÓN: Si habla opal, tiene a tapia disposición servicios gratuitos de asistencia lingüística. Llame al [phone].     CHÚ Ý: N?u b?n nói Ti?ng Vi?t, có các d?ch v? h? tr? ngôn ng? mi?n phí dành cho b?n. G?i s? [phone].         [facility] complies with applicable Federal civil rights laws and does not discriminate on the basis of race, color, national origin, age, disability, or sex.        "

## 2017-03-06 NOTE — PROGRESS NOTES
Subjective:       Patient ID: La Salgado is a 72 y.o. female.    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 Wound improving with local wound care.    1 week ago today dizziness commenced. Next day dizziness lasted all day as well. She wonders if allergy related from 10 years ago [employer] trailer. But she did not have runny nose or sneezing at the time.  Dizziness a/w hands numbness/abnormal sensory symptoms, describes dizziness as like running around in [place]. Denies actual spinning sensation. No nausea. No hearing loss. Mild dizziness as well today. Balance is normal. No ringing in ears.  denies neurologic deficit -- no weakness, no change in vision, no slurred speech or probs swallowing, no imbalance or problems walking      Review of Systems   Constitutional: Negative for activity change, chills, diaphoresis, fatigue and unexpected weight change.   HENT: Negative for congestion and postnasal drip.    Eyes: Negative for visual disturbance.   Respiratory: Negative for cough, chest tightness, shortness of breath and wheezing.    Cardiovascular: Negative for chest pain, palpitations and leg swelling.   Gastrointestinal: Negative for abdominal distention and abdominal pain.   Skin: Positive for wound (improving).   Neurological: Negative for tremors, seizures, syncope, weakness and light-headedness.       Objective:      Physical Exam   Constitutional: She is oriented to person, place, and time. She appears well-developed and well-nourished. No distress.   HENT:   Head: Normocephalic and atraumatic.   Eyes: Pupils are equal, round, and reactive to light. No scleral icterus.   Neck: Normal range of motion. No thyromegaly present.   Cardiovascular: Normal rate, regular rhythm and normal heart sounds.  Exam reveals no gallop and no friction rub.    No murmur heard.  Pulmonary/Chest: Effort normal and breath sounds normal. No respiratory  distress. She has no wheezes. She has no rales.   Abdominal: Soft. Bowel sounds are normal. She exhibits no distension and no mass. There is no tenderness. There is no rebound and no guarding.   Musculoskeletal: Normal range of motion. She exhibits no edema or tenderness.   Lymphadenopathy:     She has no cervical adenopathy.   Neurological: She is alert and oriented to person, place, and time. She displays normal reflexes. No cranial nerve deficit. She exhibits normal muscle tone. Coordination normal.   Normal romberg, neg [place]   Skin: She is not diaphoretic.   R lower leg wound superficial. No surrounding redness or warmth, no purulent drainage. No calf tenderness   Psychiatric: She has a normal mood and affect. Her speech is normal and behavior is normal. Cognition and memory are normal.       Assessment:       1. Hearing loss, unspecified hearing loss type, unspecified laterality        Plan:       La was seen today for follow-up.    Diagnoses and all orders for this visit:    Dizziness -- not sure cause, does not actually endorse vertigo, symptoms not c/w with pre-syncope or imbalance. Reassured that her exam is normal and no red flags on hx. She will call if symptoms worsen. Discuss importance of staying hydrated.    Hearing loss, unspecified hearing loss type, unspecified laterality  -     Ambulatory Referral to ENT    RLE wound improving.    Health Maintenance       Date Due Completion Date    Mammogram 2/9/2019 2/9/2017    DEXA SCAN 2/9/2020 2/9/2017    Colonoscopy 8/19/2021 8/19/2016    Lipid Panel 2/1/2022 2/1/2017    TETANUS VACCINE 1/28/2025 1/28/2015          Return in about 1 year (around 3/6/2018).

## 2017-03-09 ENCOUNTER — OFFICE VISIT (OUTPATIENT)
Dept: WOUND CARE | Facility: CLINIC | Age: 72
End: 2017-03-09
Payer: OTHER MISCELLANEOUS

## 2017-03-09 VITALS
TEMPERATURE: 98 F | HEIGHT: 67 IN | SYSTOLIC BLOOD PRESSURE: 106 MMHG | HEART RATE: 78 BPM | BODY MASS INDEX: 21.27 KG/M2 | WEIGHT: 135.5 LBS | DIASTOLIC BLOOD PRESSURE: 60 MMHG

## 2017-03-09 DIAGNOSIS — S81.801A OPEN WOUND OF LOWER LEG WITH COMPLICATION, RIGHT, INITIAL ENCOUNTER: Primary | ICD-10-CM

## 2017-03-09 PROCEDURE — 99212 OFFICE O/P EST SF 10 MIN: CPT | Mod: S$GLB,,, | Performed by: NURSE PRACTITIONER

## 2017-03-09 PROCEDURE — 99999 PR PBB SHADOW E&M-EST. PATIENT-LVL III: CPT | Mod: PBBFAC,,, | Performed by: NURSE PRACTITIONER

## 2017-03-09 NOTE — PROGRESS NOTES
Subjective:       Patient ID: La Salgado is a 72 y.o. female.    Chief Complaint: Wound Check    Wound Check        This patient is seen today for reevaluation of a traumatic wound to the right heel.  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  She is afebrile. She is using medihoney gel daily on the wound and it is healing as evidenced by wound contracture.  She denies increased redness, swelling or purulent drainage.  She has no pain.  Review of Systems   Constitutional: Negative for chills, diaphoresis and fever.   HENT: Negative for hearing loss, postnasal drip, rhinorrhea, sinus pressure, sneezing, sore throat, tinnitus and trouble swallowing.    Eyes: Negative for visual disturbance.   Respiratory: Negative for apnea, cough, shortness of breath and wheezing.    Cardiovascular: Positive for leg swelling. Negative for chest pain and palpitations.   Gastrointestinal: Negative for constipation, diarrhea, nausea and vomiting.   Genitourinary: Negative for difficulty urinating, dysuria, frequency and hematuria.   Musculoskeletal: Negative for arthralgias, back pain and joint swelling.   Skin: Positive for wound.   Neurological: Negative for dizziness, weakness, light-headedness and headaches.   Hematological: Does not bruise/bleed easily.   Psychiatric/Behavioral: Positive for sleep disturbance. Negative for confusion, decreased concentration and dysphoric mood. The patient is not nervous/anxious.        Objective:      Physical Exam   Constitutional: She is oriented to person, place, and time. She appears well-developed and well-nourished. No distress.   HENT:   Head: Normocephalic and atraumatic.   Neck: Normal range of motion.   Cardiovascular: Intact distal pulses.    Pulmonary/Chest: Effort normal. No respiratory distress.   Musculoskeletal: Normal range of motion. She exhibits edema (1-2+ right  lower leg). She exhibits no tenderness.        Feet:    Neurological: She is alert and oriented to person, place, and time.   Skin: Skin is warm and dry. No rash noted. She is not diaphoretic. There is erythema (Right lower leg).   Psychiatric: She has a normal mood and affect. Her behavior is normal. Judgment and thought content normal.   Nursing note and vitals reviewed.        Assessment:       1. Open wound of lower leg with complication, right, initial encounter        Plan:           Medihoney gel daily to right posterior leg, cover with gauze and secure with a mepore island dressing.  Return to clinic in 2 weeks.

## 2017-03-22 ENCOUNTER — OFFICE VISIT (OUTPATIENT)
Dept: WOUND CARE | Facility: CLINIC | Age: 72
End: 2017-03-22
Payer: OTHER MISCELLANEOUS

## 2017-03-22 ENCOUNTER — OFFICE VISIT (OUTPATIENT)
Dept: DERMATOLOGY | Facility: CLINIC | Age: 72
End: 2017-03-22
Payer: COMMERCIAL

## 2017-03-22 VITALS
BODY MASS INDEX: 21.45 KG/M2 | HEIGHT: 67 IN | TEMPERATURE: 99 F | HEART RATE: 79 BPM | WEIGHT: 136.69 LBS | SYSTOLIC BLOOD PRESSURE: 103 MMHG | DIASTOLIC BLOOD PRESSURE: 63 MMHG

## 2017-03-22 VITALS — WEIGHT: 135 LBS | BODY MASS INDEX: 21.14 KG/M2

## 2017-03-22 DIAGNOSIS — D18.00 ANGIOMA: ICD-10-CM

## 2017-03-22 DIAGNOSIS — L81.4 LENTIGINES: Primary | ICD-10-CM

## 2017-03-22 DIAGNOSIS — S81.801A OPEN WOUND OF LOWER LEG WITH COMPLICATION, RIGHT, INITIAL ENCOUNTER: Primary | ICD-10-CM

## 2017-03-22 PROCEDURE — 99213 OFFICE O/P EST LOW 20 MIN: CPT | Mod: S$GLB,,, | Performed by: DERMATOLOGY

## 2017-03-22 PROCEDURE — 1160F RVW MEDS BY RX/DR IN RCRD: CPT | Mod: S$GLB,,, | Performed by: DERMATOLOGY

## 2017-03-22 PROCEDURE — 1159F MED LIST DOCD IN RCRD: CPT | Mod: S$GLB,,, | Performed by: DERMATOLOGY

## 2017-03-22 PROCEDURE — 99999 PR PBB SHADOW E&M-EST. PATIENT-LVL III: CPT | Mod: PBBFAC,,, | Performed by: NURSE PRACTITIONER

## 2017-03-22 PROCEDURE — 1157F ADVNC CARE PLAN IN RCRD: CPT | Mod: S$GLB,,, | Performed by: DERMATOLOGY

## 2017-03-22 PROCEDURE — 99999 PR PBB SHADOW E&M-EST. PATIENT-LVL II: CPT | Mod: PBBFAC,,, | Performed by: DERMATOLOGY

## 2017-03-22 PROCEDURE — 99212 OFFICE O/P EST SF 10 MIN: CPT | Mod: S$GLB,,, | Performed by: NURSE PRACTITIONER

## 2017-03-22 NOTE — PROGRESS NOTES
Subjective:       Patient ID:  La Salgado is a 72 y.o. female who presents for   Chief Complaint   Patient presents with    Skin Check     UBSE    Wound Check     HPI Comments: Would like skin check no lesions of concern.      Wound Check         Review of Systems   Constitutional: Negative for fever.   Skin: Negative for itching and rash.   Hematologic/Lymphatic: Does not bruise/bleed easily.        Objective:    Physical Exam   Constitutional: She appears well-developed and well-nourished. No distress.   Neurological: She is alert and oriented to person, place, and time. She is not disoriented.   Psychiatric: She has a normal mood and affect.   Skin:   Areas Examined (abnormalities noted in diagram):   Head / Face Inspection Performed  Neck Inspection Performed  Chest / Axilla Inspection Performed  Abdomen Inspection Performed  Back Inspection Performed  RUE Inspected  LUE Inspection Performed              Diagram Legend        Vascular papule c/w angioma        Assessment / Plan:        Lentigines  sunscreen    Angioma  reassurance               Return in about 1 year (around 3/22/2018).

## 2017-03-22 NOTE — MR AVS SNAPSHOT
[place] [facility]at[facility]   [facility]  [place] [address]  Phone: [phone]  Fax: [phone]                  La Salgado   3/22/2017 1:30 PM   Office Visit    Description:  Female : 1945   Provider:  Yelitza Mullen MD   Department:  [place] [facility] D[facility]atology           Reason for Visit     Skin Check     Wound Check           Diagnoses this Visit        Comments    Lentigines    -  Primary     Angioma                To Do List           Future Appointments        Provider Department Dept Phone    3/22/2017 3:40 PM JACINTO Alicia - Wound Care [phone]      Goals (5 Years of Data)     None      Follow-Up and Disposition     Return in about 1 year (around 3/22/2018).      [facility]     [facility] On Call Nurse Care Line -  Assistance  Registered nurses in the [facility] On Call Center provide clinical advisement, health education, appointment booking, and other advisory services.  Call for this free service at [phone].             Medications           Message regarding Medications     Verify the changes and/or additions to your medication regime listed below are the same as discussed with your clinician today.  If any of these changes or additions are incorrect, please notify your healthcare provider.             Verify that the below list of medications is an accurate representation of the medications you are currently taking.  If none reported, the list may be blank. If incorrect, please contact your healthcare provider. Carry this list with you in case of emergency.           Current Medications     chlorhexidine (HIBICLENS) 4 % external liquid Apply topically daily as needed.    diclofenac (VOLTAREN) 50 MG EC tablet Take 1 tablet (50 mg total) by mouth 2 (two) times daily as needed.           Clinical Reference Information           Your Vitals Were     Weight BMI             61.2 kg (135 lb) 21.14 kg/m2         Allergies as of 3/22/2017      Amoxicillin    Codeine    Risedronate    Sulfa (Sulfonamide Antibiotics)      Immunizations Administered on Date of Encounter - 3/22/2017     None      Language Assistance Services     ATTENTION: Language assistance services are available, free of charge. Please call [phone].      ATENCIÓN: Si anitha joseph, tiene a tapia disposición servicios gratuitos de asistencia lingüística. Llame al [phone].     KIMBERLY Ý: N?u b?n nói Ti?ng Vi?t, có các d?ch v? h? tr? ngôn ng? mi?n phí dành cho b?n. G?i s? [phone].         [facility] complies with applicable Federal civil rights laws and does not discriminate on the basis of race, color, national origin, age, disability, or sex.

## 2017-03-22 NOTE — PROGRESS NOTES
Subjective:       Patient ID: La Salgado is a 72 y.o. female.    Chief Complaint: Wound Check    Wound Check        This patient is seen today for reevaluation of a traumatic wound to the right heel.  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  She is afebrile. She is using medihoney gel daily on the wound and it is healing as evidenced by wound contracture.  She denies increased redness, swelling or purulent drainage.  She has no pain.  Review of Systems   Constitutional: Negative for chills, diaphoresis and fever.   HENT: Negative for hearing loss, postnasal drip, rhinorrhea, sinus pressure, sneezing, sore throat, tinnitus and trouble swallowing.    Eyes: Negative for visual disturbance.   Respiratory: Negative for apnea, cough, shortness of breath and wheezing.    Cardiovascular: Positive for leg swelling. Negative for chest pain and palpitations.   Gastrointestinal: Negative for constipation, diarrhea, nausea and vomiting.   Genitourinary: Negative for difficulty urinating, dysuria, frequency and hematuria.   Musculoskeletal: Negative for arthralgias, back pain and joint swelling.   Skin: Positive for wound.   Neurological: Negative for dizziness, weakness, light-headedness and headaches.   Hematological: Does not bruise/bleed easily.   Psychiatric/Behavioral: Positive for sleep disturbance. Negative for confusion, decreased concentration and dysphoric mood. The patient is not nervous/anxious.        Objective:      Physical Exam   Constitutional: She is oriented to person, place, and time. She appears well-developed and well-nourished. No distress.   HENT:   Head: Normocephalic and atraumatic.   Neck: Normal range of motion.   Cardiovascular: Intact distal pulses.    Pulmonary/Chest: Effort normal. No respiratory distress.   Musculoskeletal: Normal range of motion. She exhibits edema (1-2+ right  lower leg). She exhibits no tenderness.        Feet:    Neurological: She is alert and oriented to person, place, and time.   Skin: Skin is warm and dry. No rash noted. She is not diaphoretic. There is erythema (Right lower leg).   Psychiatric: She has a normal mood and affect. Her behavior is normal. Judgment and thought content normal.   Nursing note and vitals reviewed.        Assessment:       1. Open wound of lower leg with complication, right, initial encounter        Plan:           Medihoney gel daily to right posterior leg, cover with gauze and secure with a mepore island dressing.  Return to clinic in 2 weeks.

## 2017-03-22 NOTE — MR AVS SNAPSHOT
[facility]  [address]  [facility] [address]  Phone: [phone]                  La Salgado   3/22/2017 3:40 PM   Office Visit    Description:  Female : 1945   Provider:  Christianne Perez NP   Department:  [facility]           Reason for Visit     Wound Check           Diagnoses this Visit        Comments    Open wound of lower leg with complication, right, initial encounter    -  Primary            To Do List           Future Appointments        Provider Department Dept Phone    3/22/2017 3:40 PM JACINTO Alicia - Wound Care [phone]      Goals (5 Years of Data)     None      Follow-Up and Disposition     Return in about 3 weeks (around 2017) for Wound evaluation.      [facility]     [facility] Nurse Care Line -  Assistance  Registered nurses in the [facility] On Call Center provide clinical advisement, health education, appointment booking, and other advisory services.  Call for this free service at [phone].             Medications           Message regarding Medications     Verify the changes and/or additions to your medication regime listed below are the same as discussed with your clinician today.  If any of these changes or additions are incorrect, please notify your healthcare provider.             Verify that the below list of medications is an accurate representation of the medications you are currently taking.  If none reported, the list may be blank. If incorrect, please contact your healthcare provider. Carry this list with you in case of emergency.           Current Medications     chlorhexidine (HIBICLENS) 4 % external liquid Apply topically daily as needed.    diclofenac (VOLTAREN) 50 MG EC tablet Take 1 tablet (50 mg total) by mouth 2 (two) times daily as needed.           Clinical Reference Information           Your Vitals Were     BP Pulse Temp Height Weight BMI    103/63 (BP Location: Left arm, Patient  "Position: Sitting, BP Method: Automatic) 79 98.5 °F (36.9 °C) (Oral) 5' 7" (1.702 m) 62 kg (136 lb 11.2 oz) 21.41 kg/m2      Blood Pressure          Most Recent Value    BP  103/63      Allergies as of 3/22/2017     Amoxicillin    Codeine    Risedronate    Sulfa (Sulfonamide Antibiotics)      Immunizations Administered on Date of Encounter - 3/22/2017     None      Instructions    Medihoney gel daily to right posterior leg, cover with gauze and secure with a mepore island dressing.         Language Assistance Services     ATTENTION: Language assistance services are available, free of charge. Please call [phone].      ATENCIÓN: Si baltazarla opal, tiene a tapia disposición servicios gratuitos de asistencia lingüística. Llame al [phone].     KIMBERLY Ý: N?u b?n nói Ti?ng Vi?t, có các d?ch v? h? tr? ngôn ng? mi?n phí dành cho b?n. G?i s? [phone].         [facility] complies with applicable Federal civil rights laws and does not discriminate on the basis of race, color, national origin, age, disability, or sex.        "

## 2017-03-22 NOTE — PATIENT INSTRUCTIONS
Medihoney gel daily to right posterior leg, cover with gauze and secure with a mepore island dressing.

## 2017-05-11 ENCOUNTER — PATIENT MESSAGE (OUTPATIENT)
Dept: INTERNAL MEDICINE | Facility: CLINIC | Age: 72
End: 2017-05-11

## 2017-05-11 DIAGNOSIS — R29.898 WEAKNESS OF LOWER EXTREMITY, UNSPECIFIED LATERALITY: Primary | ICD-10-CM

## 2017-05-11 DIAGNOSIS — R26.89 IMBALANCE: ICD-10-CM

## 2017-05-12 ENCOUNTER — PATIENT MESSAGE (OUTPATIENT)
Dept: INTERNAL MEDICINE | Facility: CLINIC | Age: 72
End: 2017-05-12

## 2017-05-15 ENCOUNTER — CLINICAL SUPPORT (OUTPATIENT)
Dept: REHABILITATION | Facility: HOSPITAL | Age: 72
End: 2017-05-15
Attending: INTERNAL MEDICINE
Payer: OTHER MISCELLANEOUS

## 2017-05-15 DIAGNOSIS — R29.898 RIGHT LEG WEAKNESS: Primary | ICD-10-CM

## 2017-05-15 PROCEDURE — G8978 MOBILITY CURRENT STATUS: HCPCS | Mod: CJ,PO

## 2017-05-15 PROCEDURE — 97161 PT EVAL LOW COMPLEX 20 MIN: CPT | Mod: PO

## 2017-05-15 PROCEDURE — 97110 THERAPEUTIC EXERCISES: CPT | Mod: PO

## 2017-05-15 PROCEDURE — G8979 MOBILITY GOAL STATUS: HCPCS | Mod: CJ,PO

## 2017-05-15 NOTE — PLAN OF CARE
OUTPATIENT PHYSICAL THERAPY  PHYSICAL THERAPY EVALUATION    Name: La ALCANTARA Tillman  Clinic Number: [id number]    Diagnosis:   Encounter Diagnosis   Name Primary?    Right leg weakness Yes     Physician: Mason Flowers MD  Treatment Orders: PT Eval and Treat  Past Medical History:   Diagnosis Date    Anxiety     H/O adenomatous polyp of colon 2008    Osteoporosis 12/11/2013    Plantar wart 9/6/2013       Evaluation Date: 5/15/17  Visit # authorized: 1  Authorization period: 5/11/17  Plan of care Expiration: 7/10/17      History     Precautions: standard    Onset Date: January 2017  Prior Level of Function: I with all ADLs and IADLs  Current level of Function: Difficulty with stairs, sitting to standing, long walking      History of Present Illness: La is a 72 y.o. female that presents to [facility] secondary to R LE weakness. La states she had a wound in January which got infected, and it took a long time to heal. She was unable to put weight through her R LE and had to use crutches, so she reports her leg got very weak since that time. As of now, her wound is healed and closed. Pt reports difficulty with sitting to standing and with stairs. She is no longer using an AD at this time. 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      Pain: current 3/10, worst 6/10, best 0/10, Aching, Shooting and Variable, intermittent    Pts goals: to regain leg strength       No cultural, environmental, or spiritual barriers identified to treatment or learning.    Objective       ROM:   UPPER EXTREMITY--AROM/PROM  (R) UE: WNLs  (L) UE: WNLs           RANGE OF MOTION--LOWER EXTREMITIES  RANGE OF MOTION--LOWER EXTREMITIES   (R) LE: WNLs    Hamstrings Length: WNL degrees  Ely's test: WNL degrees     (L) LE: WNLs    Hamstrings Length: WNL degrees  Ely's test: WNL degrees        Strength: manual muscle test grades below     Lower Extremity  Strength  Right LE  Left LE    Knee extension: 4/5 Knee extension: 4/5   Knee flexion: 5/5 Knee flexion: 5/5   Hip flexion: 4/5 Hip flexion: 4/5   Hip extension:  4+/5 Hip extension: 4+/5   Hip abduction: 4-/5 Hip abduction: 4-/5   Hip adduction: 3+/5 Hip adduction 3+/5   Ankle dorsiflexion: 5/5 Ankle dorsiflexion: 5/5   Ankle plantarflexion: NT Ankle plantarflexion: NT         Endurance Deficit   Evaluation   Timed Up and Go 11 sec   Single Limb Stance R LE 30 sec   Single Limb Stance L LE 30 sec   30 second Chair Rise 9 completed without UE assist             Dynamic Gait Index  1. Gait on level surface: 3  2. Change in Gait speed: 3  3. Gait with Horizontal Head Turns: 3  4. Gait with Vertical Head Turns: 3  5. Gait and Pivot turn: 3  6. Step Over Obstacle: 2  7. Step around obstacles: 3  8. Steps: 2    Score: 22/24    Stair negotiation: Must use hand held assist, slow and fatigues quickly  Reports she has trouble performing sit<>stand without UE assistl however, she is able to perform with increased concentration on technique      Postural control:  MCTSIB:  1. Eyes Open/feet together/Firm: 30 seconds  2. Eyes Closed/feet together/Firm: 30 seconds  3. Eyes Open/feet together/Foam: 30 seconds mild sway  4. Eyes Closed/feet together/Foam: 30 seconds mod increased ankle and trunk sway      Functional Limitations Reports - G Codes  Category: MOBILITY  CMS Impairment/Limitation/Restriction for FOTO Lower Leg (w/o Knee) Survey  Status Limitation G-Code CMS Severity Modifier  Intake 66% 34% Current Status CJ - At least 20 percent but less than 40 percent  Predicted 72% 28% Goal Status+ CJ - At least 20 percent but less than 40 percent  D/C Status CJ **only report if this is a one time visit    Written Home Exercises Provided: Ussed clamshells OTB 2 x 10, SLR 2 x 10, lateral steps OTB 2 x 10, bridging with abduction OTB 2 x 10, and mini squats 2 x 10    Pt demo good understanding of the education provided. La  demonstrated good return demonstration of activities x 12 minutes    Education provided re:role of PT, goals for PT, scheduling - pt verbalized understanding. Discussed insurance limitations with pt.       Assessment     La is a 72 y.o. female referred to outpatient physical therapy with decreased B hip strength and reduced functional mobility. She will perform 3 sessions in PT, and then she will be referred to the  Medical fitness program in order to continue to address strength and promote return to exercise program. Demonstrates impairments including: limitations as described in the problem list. Pt prognosis is Good. Positive prognostic factors include prior activity level, motivation, educational background. Negative prognostic factors include hip. Pt will benefit from skilled outpatient physical therapy to address the above stated deficits, provide pt/family education, and to maximize pt's level of independence.     Medical necessity is demonstrated by the following IMPAIRMENTS/PROBLEMS:  weakness, impaired endurance, impaired functional mobility, impaired balance and decreased lower extremity function  Co-morbidities and personal factors: osteoporosis  Activity Limitations: trouble with stair negotiation, sit to stands, squatting.  Participation restrictions: Limited community mobility.   Clinical presentation: stable and uncomplicated  Complexity: low    Pt's spiritual, cultural and educational needs considered and pt agreeable to plan of care and goals as stated below:     GOALS:   Short term goals: 3 visits  1. Pt will be independent with her HEP in order to improve carryover between sessions.  2. Pt will be able to perform EC/FT on foam x 30 seconds with only min ankle sway in order to show improved balance on proprioception since SOC.  3. Pt will be able to perform 12 sit to stand transfers without UE assist in order to show improved B LE strength and functional mobility.  4. Pt will be able to  ascend<>decend steps with RG and without HR in order to show improved B LE strength.  5. Pt will increase MMT for B hips to > or = 4+/5 in order to improve functional mobility.      Plan   Outpatient physical therapy 1 times weekly to include: pt ed, hep, therapeutic exercises, therapeutic activities, neuromuscular re-education/ balance exercises, joint mobilizations, aquatic therapy and modalities prn.   Cont PT for  3 weeks. Patient may be seen by PTA as part of the rehabilitation team.   Ashley Holstein, PT 5/15/2017     I certify the need for these services furnished under this plan of treatment and while under my care.  ____________________________________ Physician/Referring Practitioner   Date of Signature

## 2017-06-01 ENCOUNTER — CLINICAL SUPPORT (OUTPATIENT)
Dept: REHABILITATION | Facility: HOSPITAL | Age: 72
End: 2017-06-01
Attending: INTERNAL MEDICINE
Payer: OTHER MISCELLANEOUS

## 2017-06-01 DIAGNOSIS — R29.898 RIGHT LEG WEAKNESS: Primary | ICD-10-CM

## 2017-06-01 PROCEDURE — 97110 THERAPEUTIC EXERCISES: CPT | Mod: PO

## 2017-06-01 NOTE — PROGRESS NOTES
"                                                    Physical Therapy Daily Note     [id number]  Diagnosis:   Encounter Diagnosis   Name Primary?    Right leg weakness Yes     Physician: Mason Flowers MD  Precautions: standard  Visit #: 2 of 12     Time In: 11:00  Time Out: 11:55  Treatment time: 50    Subjective     Pt reports: onset of (R) LE weakness after suffering (R) lower leg injury with complicated wound healing issue. SHe reports some chronic arthritic knee problems.   Pain Scale: La rates pain on a scale of 0-10 to be 3 currently to (B) knees.    Objective     La received individual therapeutic exercises to develop LE strength, endurance, ROM and posture for 50 minutes including:  SLR 2 x 10  Sidelying Hip abd 2 x 10  Sidelying clamshells with OTB 2 x 10  Bridging with Hip abd OTB 2 x 10  Standing Heel raises 2 x 10  Standing hip abd and ext 2 x 10 each  Mini squats on foam 2 x 10 ( cues to avoid anterior translation of knees over toes)  Repeated sit<->stand from standard height chair x 10 trials without UE support ( cues for eccentric lowering)  Forward step up on (6") step with contralateral hip flex x 10 each with UE support.  Lateral band walk with OTB 4 x 20 feet  Shuttle leg press with 2.5 bands 2 x 10  Stance on foam EC/FT eyes closed 3 x 20 sec ( Mild swaying) SBA     Written Home Exercises Provided: Patient educated in and provided with an updated copy of HEP to include: sidelying hip abd, Standing hip ext and hip abd,  Pt demo good understanding of the education provided. La demonstrated good return demonstration of activities.    No spiritual or educational barriers to learning      Assessment     Patient tolerated jeimy Tx well. She was able to perform the above ex's within tolerable knee discomfort and muscular fatigue. Some difficulty evident with sit<-->stand transition from lower surfaces with some knee crepitus evident. Patient likely to " benefit from our medical fitness program which she expresses interest in and she was provided with a brochure..   This is a 72 y.o. female referred to outpatient physical therapy and presents with a medical diagnosis of   Encounter Diagnosis   Name Primary?    Right leg weakness Yes    and demonstrates limitations as described in the problem list.  Pt will continue to benefit from skilled outpatient physical therapy to address the deficits listed in the problem list, provide pt/family education and to maximize pt's level of independence in the home and community environment.     GOALS:   Short term goals: 3 visits  1. Pt will be independent with her HEP in order to improve carryover between sessions.  2. Pt will be able to perform EC/FT on foam x 30 seconds with only min ankle sway in order to show improved balance on proprioception since SOC.  3. Pt will be able to perform 12 sit to stand transfers without UE assist in order to show improved B LE strength and functional mobility.  4. Pt will be able to ascend<>decend steps with RG and without HR in order to show improved B LE strength.  Pt will increase MMT for B hips to > or = 4+/5 in order to improve functional mobility.   Plan     Continue with established Plan of Care towards PT goals.    Therapist: Matty Goel, PTA  6/1/2017

## 2017-06-22 ENCOUNTER — CLINICAL SUPPORT (OUTPATIENT)
Dept: REHABILITATION | Facility: HOSPITAL | Age: 72
End: 2017-06-22
Attending: NURSE PRACTITIONER
Payer: OTHER MISCELLANEOUS

## 2017-06-22 DIAGNOSIS — R29.898 RIGHT LEG WEAKNESS: ICD-10-CM

## 2017-06-22 PROCEDURE — 97110 THERAPEUTIC EXERCISES: CPT | Mod: PO

## 2017-06-22 NOTE — PROGRESS NOTES
"                                                    Physical Therapy Daily Note     Name: La Clarkkinson  Clinic Number: [id number]  Diagnosis:   Encounter Diagnosis   Name Primary?    Right leg weakness      Physician: Mason Flowers MD  Precautions: standard  Visit #: 3 of 12     Time In: 11:00  Time Out: 11:55  Treatment time: 55 minutes    Subjective     Pt reports: onset of (R) LE weakness after suffering (R) lower leg injury with complicated wound healing issue. She reports some chronic arthritic knee problems.   Pain Scale: La rates pain on a scale of 0-10 to be 3 currently to (B) knees.    Objective     La received individual therapeutic exercises to develop LE strength, endurance, ROM and posture for 55 minutes including:  SLR 2 x 10  Sidelying Hip abd 2 x 10  Sidelying clamshells with OTB 2 x 10  Bridging with Hip abd OTB 2 x 10  Standing Heel raises 2 x 10  Standing hip abd and ext 2 x 10 each  Mini squats on foam 2 x 10 ( cues to avoid anterior translation of knees over toes)  Repeated sit<->stand from standard height chair x 10 trials without UE support ( cues for eccentric lowering)  Forward step up on (6") step with contralateral hip flex x 10 each with UE support.  Lateral band walk with OTB 4 x 20 feet  Shuttle leg press with 2.5 bands 2 x 10  Stance on foam EC/FT eyes closed 3 x 20 sec ( Mild swaying) SBA   Kinesiotape to R knee for patellar unloading    Written Home Exercises Provided: Patient educated in and provided with an updated copy of HEP to include: sidelying hip abd, Standing hip ext and hip abd,  Pt demo good understanding of the education provided. La demonstrated good return demonstration of activities.    No spiritual or educational barriers to learning      Assessment     Pt had good tolerance to tx session today without increased pain or symptoms. Responded well to kinesiotaping with reports of decreased pain and increased function. Will reassess pt, teach her how to " self tape, and give home exercises with a medical fitness referral at next session.  This is a 72 y.o. female referred to outpatient physical therapy and presents with a medical diagnosis of   Encounter Diagnosis   Name Primary?    Right leg weakness Yes    and demonstrates limitations as described in the problem list.  Pt will continue to benefit from skilled outpatient physical therapy to address the deficits listed in the problem list, provide pt/family education and to maximize pt's level of independence in the home and community environment.     GOALS:   Short term goals: 3 visits  1. Pt will be independent with her HEP in order to improve carryover between sessions.  2. Pt will be able to perform EC/FT on foam x 30 seconds with only min ankle sway in order to show improved balance on proprioception since SOC.  3. Pt will be able to perform 12 sit to stand transfers without UE assist in order to show improved B LE strength and functional mobility.  4. Pt will be able to ascend<>decend steps with RG and without HR in order to show improved B LE strength.  Pt will increase MMT for B hips to > or = 4+/5 in order to improve functional mobility.   Plan     Continue with established Plan of Care towards PT goals.    Therapist: Ashley Holstein, PT  6/22/2017

## 2017-06-28 ENCOUNTER — CLINICAL SUPPORT (OUTPATIENT)
Dept: REHABILITATION | Facility: HOSPITAL | Age: 72
End: 2017-06-28
Attending: INTERNAL MEDICINE
Payer: OTHER MISCELLANEOUS

## 2017-06-28 DIAGNOSIS — R29.898 RIGHT LEG WEAKNESS: ICD-10-CM

## 2017-06-28 PROCEDURE — 97110 THERAPEUTIC EXERCISES: CPT | Mod: PO

## 2017-06-28 NOTE — PROGRESS NOTES
"                                                    Physical Therapy Daily Note     Name: La Clarkkinson  Clinic Number: [id number]  Diagnosis:   Encounter Diagnosis   Name Primary?    Right leg weakness      Physician: Mason Flowers MD  Precautions: standard  Visit #: 4 of 12     Time In: 10:05  Time Out: 11:00  Treatment time: 55 minutes    Subjective     Pt reports: her R foot swelled u on her over the past few days  Pain Scale: La rates pain on a scale of 0-10 to be 3 currently to (B) knees.    Objective     Strength: manual muscle test grades below      Lower Extremity Strength  Right LE   Left LE     Knee extension: 4+/5 Knee extension: 4+/5   Knee flexion: 5/5 Knee flexion: 5/5   Hip flexion: 4+/5 Hip flexion: 4+/5   Hip extension:  4+/5 Hip extension: 4+/5   Hip abduction: 4/5 Hip abduction: 4/5   Hip adduction: 4-/5 Hip adduction 4-/5   Ankle dorsiflexion: 5/5 Ankle dorsiflexion: 5/5   Ankle plantarflexion: NT Ankle plantarflexion: NT            Endurance Deficit    Evaluation   Timed Up and Go 11 sec   Single Limb Stance R LE 30 sec   Single Limb Stance L LE 30 sec   30 second Chair Rise 12 completed without UE assist                La received individual therapeutic exercises to develop LE strength, endurance, ROM and posture for 55 minutes including:  SLR 2 x 10  Sidelying Hip abd 2 x 10  Sidelying clamshells with GTB 2 x 10  Bridging with Hip abd GTB 2 x 10  Standing Heel raises 2 x 10  Standing hip abd and ext 2 x 10 each  Mini squats on foam 2 x 10 ( cues to avoid anterior translation of knees over toes)  Repeated sit<->stand from standard height chair x 10 trials without UE support ( cues for eccentric lowering)  Forward step up on (6") step with contralateral hip flex x 10 each with UE support.  Lateral band walk with OTB 4 x 20 feet  Shuttle leg press with 2.5 bands 2 x 10  Stance on foam EC/FT eyes closed 3 x 20 sec ( Mild swaying) SBA   Kinesiotape to R knee for patellar " unloading    Written Home Exercises Provided: Patient educated in and provided with an updated copy of HEP to include: sidelying hip abd, Standing hip ext and hip abd,  Pt demo good understanding of the education provided. La demonstrated good return demonstration of activities.    No spiritual or educational barriers to learning      Assessment     Pt has met 4/5 goals at this time, and she is independent with her exercise performance. She is being referred to the medical fitness program involving one month of a gym membership with a nutritional and personal training consult. She was issued an updated HEP with eddie in order to maintain current exercise program. She is D/C'd at this time.    GOALS:   Short term goals: 3 visits  1. Pt will be independent with her HEP in order to improve carryover between sessions. Met 6/28/17  2. Pt will be able to perform EC/FT on foam x 30 seconds with only min ankle sway in order to show improved balance on proprioception since SOC.  Met 6/28/17  3. Pt will be able to perform 12 sit to stand transfers without UE assist in order to show improved B LE strength and functional mobility.  Met 6/28/17  4. Pt will be able to ascend<>decend steps with RG and without HR in order to show improved B LE strength.  Met 6/28/17             5.   Pt will increase MMT for B hips to > or = 4+/5 in order to improve functional mobility. In progress 6/28/17     Plan     DC from PT at this time    Therapist: Ashley Holstein, JANY  6/28/2017

## 2017-07-09 ENCOUNTER — PATIENT MESSAGE (OUTPATIENT)
Dept: INTERNAL MEDICINE | Facility: CLINIC | Age: 72
End: 2017-07-09

## 2017-07-10 ENCOUNTER — PATIENT MESSAGE (OUTPATIENT)
Dept: INTERNAL MEDICINE | Facility: CLINIC | Age: 72
End: 2017-07-10

## 2017-07-20 ENCOUNTER — OFFICE VISIT (OUTPATIENT)
Dept: INTERNAL MEDICINE | Facility: CLINIC | Age: 72
End: 2017-07-20
Payer: COMMERCIAL

## 2017-07-20 VITALS
DIASTOLIC BLOOD PRESSURE: 72 MMHG | HEIGHT: 67 IN | WEIGHT: 135.81 LBS | SYSTOLIC BLOOD PRESSURE: 128 MMHG | OXYGEN SATURATION: 98 % | HEART RATE: 83 BPM | BODY MASS INDEX: 21.31 KG/M2

## 2017-07-20 DIAGNOSIS — R47.89 WORD FINDING DIFFICULTY: Primary | ICD-10-CM

## 2017-07-20 PROCEDURE — 99999 PR PBB SHADOW E&M-EST. PATIENT-LVL III: CPT | Mod: PBBFAC,,, | Performed by: INTERNAL MEDICINE

## 2017-07-20 PROCEDURE — 99214 OFFICE O/P EST MOD 30 MIN: CPT | Mod: S$GLB,,, | Performed by: INTERNAL MEDICINE

## 2017-07-20 PROCEDURE — 1159F MED LIST DOCD IN RCRD: CPT | Mod: S$GLB,,, | Performed by: INTERNAL MEDICINE

## 2017-07-20 PROCEDURE — 1126F AMNT PAIN NOTED NONE PRSNT: CPT | Mod: S$GLB,,, | Performed by: INTERNAL MEDICINE

## 2017-07-20 NOTE — PROGRESS NOTES
Subjective:       Patient ID: La Salgado is a 72 y.o. female.    Chief Complaint: Follow-up    Worried re memory.    1 yr ago felt like a sudden worsened memory (WFD). Has leveled off since sudden sense of WFD.    Mini-mental:   July 2017, 17  4/5:  Orientation-(year/season/date/day/month)?  5/5:  Orientation-(state/country/town/building/floor)?  3/3:  Registration-(name 3 objects, # trials to learn: 1)?  5/5:  Attention-spell WORLD backwards?  3/3:  Recall-(3 objects above)?  2/2:  Language-(name pen & watch)?  1/1:  Language-(no ifs ands or buts)?  3/3:  Follow 3-step command?  1/1:  Read & obey Close your eyes?  1/1:  write a sentence?  1/1:  Copy intersecting pentagons?  -----?  29/30: Total?    Denies Fam hx of early dementia.        Review of Systems   Constitutional: Negative for activity change and unexpected weight change.   HENT: Negative for hearing loss, rhinorrhea and trouble swallowing.    Eyes: Negative for discharge and visual disturbance.   Respiratory: Negative for chest tightness and wheezing.    Cardiovascular: Negative for chest pain and palpitations.   Gastrointestinal: Negative for blood in stool, constipation, diarrhea and vomiting.   Endocrine: Negative for polydipsia and polyuria.   Genitourinary: Negative for difficulty urinating, dysuria, hematuria and menstrual problem.   Musculoskeletal: Negative for arthralgias, joint swelling and neck pain.   Neurological: Negative for weakness and headaches.   Psychiatric/Behavioral: Negative for confusion and dysphoric mood.       Objective:      Physical Exam   Constitutional: She is oriented to person, place, and time. She appears well-developed and well-nourished. No distress.   HENT:   Head: Normocephalic and atraumatic.   Eyes: Pupils are equal, round, and reactive to light. No scleral icterus.   Neck: Normal range of motion. No thyromegaly present.   Cardiovascular: Normal rate, regular rhythm and normal heart sounds.  Exam reveals no  gallop and no friction rub.    No murmur heard.  Pulmonary/Chest: Effort normal and breath sounds normal. No respiratory distress. She has no wheezes. She has no rales.   Abdominal: Soft. Bowel sounds are normal. She exhibits no distension and no mass. There is no tenderness. There is no rebound and no guarding.   Musculoskeletal: Normal range of motion. She exhibits no edema or tenderness.   Lymphadenopathy:     She has no cervical adenopathy.   Neurological: She is alert and oriented to person, place, and time. She displays normal reflexes. No cranial nerve deficit. She exhibits normal muscle tone. Coordination normal.   Skin: She is not diaphoretic.   Psychiatric: She has a normal mood and affect. Her speech is normal and behavior is normal. Cognition and memory are normal.       Assessment:       No diagnosis found.    Plan:       Here for memory assessment.    Her MMSE is normal, CDT is normal.    She appears well and h/o does not suggest clear cognitive impairment.    She will call if symptoms worsen.    Discussed importance of back to exercise.    She does not want statin or ASA, discussed.    Health Maintenance       Date Due Completion Date    Influenza Vaccine 08/01/2017 3/6/2017 (Done)    Override on 3/6/2017: Done    Override on 12/18/2015: Done    Mammogram 02/10/2019 2/10/2017    DEXA SCAN 02/09/2020 2/9/2017    Colonoscopy 08/19/2021 8/19/2016    Lipid Panel 02/01/2022 2/1/2017    TETANUS VACCINE 01/28/2025 1/28/2015        Return to clinic previously agreed (at last visit) next due visit

## 2017-07-20 NOTE — PATIENT INSTRUCTIONS
Understanding Fat and Cholesterol  Too much cholesterol in your blood can lead to many problems such as blocked arteries. This can cause problems such as heart attack and stroke. One of the best ways to manage heart and blood vessel disease is to lower your blood cholesterol. Planning meals that are low in saturated fat and cholesterol helps reduce the level of cholesterol in your blood. Below are eating tips to help lower your blood cholesterol levels.  Eat Less Fat  A healthy goal is to have less than 25% of your daily calories come from fat. Instead of fats, eat more fruits, grains, and vegetables. This also helps control your weight, and can even reduce your risk for some cancers. There are different kinds of fats in foods. Fats can be saturated, unsaturated, or trans fats. The best fats to choose are unsaturated fats. But fats are high in calories, so eat even unsaturated fats sparingly.  Limit Foods High in Saturated Fats  Saturated fats come from animals and certain plants (such as coconut and palm). Eating too much saturated fat can raise your blood cholesterol levels and make your artery problems worse. Your goal is to eat less saturated fat. Below are some examples of foods that contain lots of saturated fat:  · Fatty cuts of meat (lamb, ham, beef)  · Many pastries, cakes, cookies, and candies  · Cream, ice cream, sour cream, cheese, and butter, and foods made with them  · Sauces made with butter or cream  · Salad dressings with saturated fats  · Foods that contain palm or coconut oil  Choose Unsaturated Fats  Unsaturated fats are usually liquid at room temperature. They are better choices for your heart than saturated fat. There are two types of unsaturated fats: polyunsaturated fat and monounsaturated fat. Aim to replace saturated fats with polyunsaturated or monounsaturated fats.  · Polyunsaturated fats are found in corn oil, safflower oil, sunflower oil, and other vegetable oils.  · Monounsaturated  fats are found in olive oil, canola oil, and peanut oil. Some margarines and spreads are now made with these oils, too. Avacados are also high in monounsaturated fat.  Of all fats, monounsaturated fats are the least harmful to your heart.  Avoid Trans Fats  Like saturated fats, trans fats have been linked to heart disease. Even a small amount can harm your health. Trans fats are found in liquid oils that have been changed to be solid at room temperature. Margarine, which is often made from vegetable oil, is one example. Vegetable shortening is another. Trans fats are often found in packaged goods. Check ingredients for the words hydrogenated or partially hydrogenated. They mean the foods contain trans fat.  Eat Less Cholesterol  Eating foods that contain cholesterol can also raise your blood cholesterol. Try to eat less than 200 mg of cholesterol a day. Food labels will tell you how much cholesterol is in the foods you eat.  Limit Foods High in Cholesterol  You cant see cholesterol. You have to read food labels to check the cholesterol in the foods you eat. Avoid or limit these high-cholesterol foods:  · Liver and other organ meats  · Fatty red meats  · Nelson and sausage  · Egg yolks (egg whites are okay)  · Shrimp  What About Triglycerides?  Triglycerides are a type of fat in your blood. Like cholesterol, high levels of triglycerides can lead to blocked arteries. Too much sugar and certain carbohydrates in your diet can raise triglyceride levels in your blood. Your doctor or nutritionist may advise you to avoid alcohol and to cut down on foods that are high in sugar and fat, especially if you have diabetes.    Reading Food Labels  Luckily, most foods now have labels giving you the facts about what youre eating. Reading food labels helps you make healthy choices. Look for the words highlighted below.  · Serving Size. This is the amount of food in 1 serving. If you eat larger portions, be sure to count more of  everything: fat, calories, and cholesterol.  · Total Fat. Tells you how many grams (g) of fat are in 1 serving.  · Calories from Fat. This tells you the total number of calories from fat in 1 serving (there are 9 calories per gram of fat). Look for foods with the fewest calories from fat.  · Saturated Fat. Tells you how many grams (g) of saturated fat are in 1 serving.  · Trans Fat. Tells how many grams (g) of trans fat are in 1 serving.  · Cholesterol. Tells you how many milligrams (mg) of cholesterol are in 1 serving.  © 7513-2163 [facility], [address] [place] [address], [place] [address]. All rights reserved. This information is not intended as a substitute for professional medical care. Always follow your healthcare professional's instructions.        Understanding Food and Cholesterol  What you eat has a big effect on your bodys cholesterol level. Eating certain foods can raise your cholesterol. Other foods can help you lower it. Watching what you eat can help you get your cholesterol level under control.    Know High-Cholesterol Foods  Foods high in fat, cholesterol, or both:  · Fatty beef, cold cuts, lee, sausage  · Creamy sauces and fatty gravies  · Cookies, donuts, muffins, and pastries  · Fried foods  · Egg yolks  · Shortening, butter, coconut oil, palm oil, hydrogenated oils (read labels)  · High-fat dairy products, such as whole milk, cheese, and ice cream  Better choices:  · Lean beef, skinless white-meat poultry, fish  · Tomato sauce, vegetable puree  · Dried fruit, bagels, bread with jam  · Baked, broiled, steamed, or roasted foods  · Egg whites or egg substitute  · Tub margarine, canola oil, and olive oil in moderation  · Low-fat or nonfat dairy products, such as 1% or fat-free milk, reduced-fat cheese, and nonfat frozen yogurt  Use Fiber to Help Control Cholesterol  Foods high in fiber can help you keep your cholesterol down. Good sources of fiber are:  · Oats, barley  · Whole  grains  · Beans  · Vegetables  · Cornmeal, popcorn  · Berries, apples, other fruits  © 6293-3765 [facility], [address] [place] [address], [place] [address]. All rights reserved. This information is not intended as a substitute for professional medical care. Always follow your healthcare professional's instructions.

## 2017-10-01 ENCOUNTER — NURSE TRIAGE (OUTPATIENT)
Dept: ADMINISTRATIVE | Facility: CLINIC | Age: 72
End: 2017-10-01

## 2017-10-01 NOTE — TELEPHONE ENCOUNTER
"  Reason for Disposition   [1] Symptoms of anxiety or panic AND [2] has not been evaluated for this by physician    Answer Assessment - Initial Assessment Questions  1. CONCERN: "What happened that made you call today?"      Cold sweat, and weak at the knees and shakey  2. ANXIETY SYMPTOM SCREENING: "Can you describe how you have been feeling?"  (e.g., tense, restless, panicky, anxious, keyed up, trouble sleeping, trouble concentrating)      Tense, concerned about exercise.  3. ONSET: "How long have you been feeling this way?"      It has been 15 years  4. RECURRENT: "Have you felt this way before?"  If yes: "What happened that time?" "What helped these feelings go away in the past?"       15 years ago  5. RISK OF HARM - SUICIDAL IDEATION:  "Do you ever have thoughts of hurting or killing yourself?"  (e.g., yes, no, no but preoccupation with thoughts about death)    - INTENT:  "Do you have thoughts of hurting or killing yourself right NOW?" (e.g., yes, no, N/A)    - PLAN: "Do you have a specific plan for how you would do this?" (e.g., gun, knife, overdose, no plan, N/A)      no  6. RISK OF HARM - HOMICIDAL IDEATION:  "Do you ever have thoughts of hurting or killing someone else?"  (e.g., yes, no, no but preoccupation with thoughts about death)    - INTENT:  "Do you have thoughts of hurting or killing someone right NOW?" (e.g., yes, no, N/A)    - PLAN: "Do you have a specific plan for how you would do this?" (e.g., gun, knife, no plan, N/A)       no  7. FUNCTIONAL IMPAIRMENT: "How have things been going for you overall in your life? Have you had any more difficulties than usual doing your normal daily activities?"  (e.g., better, same, worse; self-care, school, work, interactions)      no  8. SUPPORT: "Who is with you now?" "Who do you live with?" "Do you have family or friends nearby who you can talk to?"       No one  9. THERAPIST: "Do you have a counselor or therapist? Name?"      no  10. STRESSORS: "Has there " "been any new stress or recent changes in your life?"        No, Bucky  11. CAFFEINE ABUSE: "Do you drink caffeinated beverages, and how much each day?" (e.g., coffee, tea, олег)        yes  12. SUBSTANCE ABUSE: "Do you use any illegal drugs or alcohol?"        no  13. OTHER SYMPTOMS: "Do you have any other physical symptoms right now?" (e.g., chest pain, palpitations, difficulty breathing, fever)        Cold sweat,  14. PREGNANCY: "Is there any chance you are pregnant?" "When was your last menstrual period?"       n/a    Protocols used: ST ANXIETY AND PANIC ATTACK-A-AH    "

## 2017-10-05 ENCOUNTER — HOSPITAL ENCOUNTER (EMERGENCY)
Facility: HOSPITAL | Age: 72
Discharge: HOME | End: 2017-10-05
Attending: EMERGENCY MEDICINE
Payer: COMMERCIAL

## 2017-10-05 ENCOUNTER — NURSE TRIAGE (OUTPATIENT)
Dept: ADMINISTRATIVE | Facility: CLINIC | Age: 72
End: 2017-10-05

## 2017-10-05 VITALS
RESPIRATION RATE: 18 BRPM | HEART RATE: 65 BPM | DIASTOLIC BLOOD PRESSURE: 64 MMHG | SYSTOLIC BLOOD PRESSURE: 130 MMHG | WEIGHT: 135 LBS | BODY MASS INDEX: 21.19 KG/M2 | OXYGEN SATURATION: 98 % | TEMPERATURE: 98 F | HEIGHT: 67 IN

## 2017-10-05 DIAGNOSIS — R20.2 TINGLING: Primary | ICD-10-CM

## 2017-10-05 LAB
ALBUMIN SERPL BCP-MCNC: 3.3 G/DL
ALP SERPL-CCNC: 93 U/L
ALT SERPL W/O P-5'-P-CCNC: 16 U/L
ANION GAP SERPL CALC-SCNC: 8 MMOL/L
AST SERPL-CCNC: 21 U/L
BASOPHILS # BLD AUTO: 0.02 K/UL
BASOPHILS NFR BLD: 0.5 %
BILIRUB SERPL-MCNC: 0.3 MG/DL
BNP SERPL-MCNC: 65 PG/ML
BUN SERPL-MCNC: 13 MG/DL
CALCIUM SERPL-MCNC: 9.1 MG/DL
CHLORIDE SERPL-SCNC: 110 MMOL/L
CO2 SERPL-SCNC: 22 MMOL/L
CREAT SERPL-MCNC: 0.8 MG/DL
DIFFERENTIAL METHOD: NORMAL
EOSINOPHIL # BLD AUTO: 0.1 K/UL
EOSINOPHIL NFR BLD: 1.7 %
ERYTHROCYTE [DISTWIDTH] IN BLOOD BY AUTOMATED COUNT: 13.7 %
EST. GFR  (AFRICAN AMERICAN): >60 ML/MIN/1.73 M^2
EST. GFR  (NON AFRICAN AMERICAN): >60 ML/MIN/1.73 M^2
GLUCOSE SERPL-MCNC: 94 MG/DL
HCT VFR BLD AUTO: 37.1 %
HGB BLD-MCNC: 12.3 G/DL
LYMPHOCYTES # BLD AUTO: 1.5 K/UL
LYMPHOCYTES NFR BLD: 36.8 %
MCH RBC QN AUTO: 28.1 PG
MCHC RBC AUTO-ENTMCNC: 33.2 G/DL
MCV RBC AUTO: 85 FL
MONOCYTES # BLD AUTO: 0.5 K/UL
MONOCYTES NFR BLD: 11.7 %
NEUTROPHILS # BLD AUTO: 2.1 K/UL
NEUTROPHILS NFR BLD: 49.1 %
PLATELET # BLD AUTO: 224 K/UL
PMV BLD AUTO: 9.4 FL
POCT GLUCOSE: 87 MG/DL (ref 70–110)
POTASSIUM SERPL-SCNC: 4 MMOL/L
PROT SERPL-MCNC: 6.9 G/DL
RBC # BLD AUTO: 4.38 M/UL
SODIUM SERPL-SCNC: 140 MMOL/L
TROPONIN I SERPL DL<=0.01 NG/ML-MCNC: <0.006 NG/ML
WBC # BLD AUTO: 4.19 K/UL

## 2017-10-05 PROCEDURE — 84484 ASSAY OF TROPONIN QUANT: CPT

## 2017-10-05 PROCEDURE — 93010 ELECTROCARDIOGRAM REPORT: CPT | Mod: ,,, | Performed by: INTERNAL MEDICINE

## 2017-10-05 PROCEDURE — 99284 EMERGENCY DEPT VISIT MOD MDM: CPT | Mod: 25

## 2017-10-05 PROCEDURE — 93005 ELECTROCARDIOGRAM TRACING: CPT

## 2017-10-05 PROCEDURE — 80053 COMPREHEN METABOLIC PANEL: CPT

## 2017-10-05 PROCEDURE — 82962 GLUCOSE BLOOD TEST: CPT

## 2017-10-05 PROCEDURE — 83880 ASSAY OF NATRIURETIC PEPTIDE: CPT

## 2017-10-05 PROCEDURE — 85025 COMPLETE CBC W/AUTO DIFF WBC: CPT

## 2017-10-05 PROCEDURE — 99284 EMERGENCY DEPT VISIT MOD MDM: CPT | Mod: ,,, | Performed by: EMERGENCY MEDICINE

## 2017-10-05 NOTE — TELEPHONE ENCOUNTER
"Having sweating and tngling in both hands. Uncomfortable feeling of shaking, nervous and sweats. Lifted 2 suitcases over head and had those same symptoms that last a couple of hours.. Pt questioned about previous panic attack and states this is different and denies being a panic attack. Pt feels that she may be having symptoms of a heart attack Not usual symptoms but pt has uncomfortable feeling about present situation. Caller advised to ED at this time.     Answer Assessment - Initial Assessment Questions  1. SYMPTOM: "What is the main symptom you are concerned about?" (e.g., weakness, numbness)      Tingling in both hands  2. ONSET: "When did this start?" (minutes, hours, days; while sleeping)      Sunday and just now  3. LAST NORMAL: "When was the last time you were normal (no symptoms)?"      Just started sunday  4. PATTERN "Does this come and go, or has it been constant since it started?"  "Is it present now?"      Comes and goes  5. CARDIAC SYMPTOMS: "Have you had any of the following symptoms: chest pain, difficulty breathing, palpitations?"      denies  6. NEUROLOGIC SYMPTOMS: "Have you had any of the following symptoms: headache, dizziness, vision loss, double vision, changes in speech, unsteady on your feet?"      denies  7. OTHER SYMPTOMS: "Do you have any other symptoms?"      Tingling in both hands  8. PREGNANCY: "Is there any chance you are pregnant?" "When was your last menstrual period?"      Not applicable    Protocols used: ST NEUROLOGIC DEFICIT-A-AH      "

## 2017-10-05 NOTE — ED NOTES
Patient stable, discharge instruction given and patient verb understanding. Walked  to check out area with green discharge folder.

## 2017-10-05 NOTE — ED NOTES
Pt identifiers La Salgado checked and correct  LOC: The patient is awake, alert, aware of environment with an appropriate affect. Oriented x3, speaking appropriately  APPEARANCE: Pt resting comfortably, in no acute distress, pt is clean and well groomed, clothing properly fastened  SKIN: Skin warm, dry and intact, normal skin turgor, moist mucus membranes  RESPIRATORY: Airway is open and patent, respirations are spontaneous, even and unlabored, normal effort and rate  CARDIAC: Normal rate and rhythm, no peripheral edema noted, capillary refill < 3 seconds, bilateral radial pulses 2+  NEUROLOGIC: PERRL, facial expression is symmetrical, patient moving all extremities spontaneously, normal sensation in all extremities when touched with a finger.  Follows all commands appropriately. C/o of tingling in hands.   MUSCULOSKELETAL: No obvious deformities.

## 2017-10-05 NOTE — ED TRIAGE NOTES
Pt reports experiencing shakiness, tingling in her hands, and sweating on Sunday. Pt reports waking up this morning with the same symptoms.

## 2017-10-05 NOTE — ED PROVIDER NOTES
Encounter Date: 10/5/2017    SCRIBE #1 NOTE: I, Jeannette Kelley, am scribing for, and in the presence of,  Dr. Puente. I have scribed the entire note.       History     Chief Complaint   Patient presents with    Tingling     Pt reports bilateral tingling of hands and woke up diaphoretic.      Time seen by provider: 6:08 AM    This is a 72 y.o. female with history of anxiety who presents with complaint of acute diaphoresis and tingling to the hands upon waking this morning at 4:30 AM. Pt states the tingling goes from her forearms to her hands but denies numbness and weakness. She states she took 2 baby aspirin prior to arrival. She notes she had a similar episode 4 days ago after lifting suitcases. She reports she has had increasing reflux for the last several months and has an appointment with her PCP in a few weeks and she has recently started light exercise with a . Pt denies chest pain or pressure, palpitations, near syncope, numbness in legs, nausea, fever, chills, rhinorrhea, sore throat, back pain, and increased stress. She notes this does not feel like previous episode of anxiety.       The history is provided by the patient.     Review of patient's allergies indicates:   Allergen Reactions    Amoxicillin Other (See Comments)     Hand tingling. Streaking redness in hand    Codeine Other (See Comments)     nausea    Risedronate      Other reaction(s): triggers an auto immune attack    Sulfa (sulfonamide antibiotics)      Nausea       Past Medical History:   Diagnosis Date    Anxiety     H/O adenomatous polyp of colon 2008    Osteoporosis 12/11/2013    Plantar wart 9/6/2013     Past Surgical History:   Procedure Laterality Date    COLONOSCOPY  2011    COLONOSCOPY N/A 8/19/2016    Procedure: COLONOSCOPY;  Surgeon: Juan Marte MD;  Location: [address]);  Service: Endoscopy;  Laterality: N/A;  5 year follow up    KNEE SURGERY Right     PARTIAL HYSTERECTOMY       Family History    Problem Relation Age of Onset    Skin cancer Father     ALS Father     Heart disease Mother      arrhythmia    Dementia Mother     Seizures Mother      Social History   Substance Use Topics    Smoking status: Never Smoker    Smokeless tobacco: Never Used    Alcohol use Yes      Comment: rare     Review of Systems   Constitutional: Positive for diaphoresis. Negative for chills and fever.   HENT: Negative for rhinorrhea and sore throat.    Eyes: Negative for visual disturbance.   Respiratory: Negative for shortness of breath.    Cardiovascular: Negative for chest pain and palpitations.   Gastrointestinal: Negative for nausea.   Genitourinary: Negative for dysuria.   Musculoskeletal: Negative for back pain.   Skin: Negative for rash.   Neurological: Negative for syncope, weakness and numbness.        Positive for tingling to bilateral hands.        Physical Exam     Initial Vitals [10/05/17 0525]   BP Pulse Resp Temp SpO2   (!) 163/76 93 19 97.9 °F (36.6 °C) 96 %      MAP       105         Physical Exam    Nursing note and vitals reviewed.  Constitutional: She appears well-developed and well-nourished. She is not diaphoretic. No distress.   HENT:   Head: Normocephalic and atraumatic.   Mouth/Throat: Oropharynx is clear and moist.   Eyes: EOM are normal. Pupils are equal, round, and reactive to light.   Neck: Normal range of motion. Neck supple. No JVD present.   Cardiovascular: Normal rate, regular rhythm, normal heart sounds and intact distal pulses.   Pulmonary/Chest: Breath sounds normal. No respiratory distress. She has no wheezes. She has no rhonchi. She has no rales.   Abdominal: Soft. She exhibits no distension. There is no tenderness.   Musculoskeletal: Normal range of motion. She exhibits no edema.   Lymphadenopathy:     She has no cervical adenopathy.   Neurological: She is alert and oriented to person, place, and time. She has normal strength. No cranial nerve deficit or sensory deficit.   Skin:  Skin is warm and dry.         ED Course   Procedures  Labs Reviewed   COMPREHENSIVE METABOLIC PANEL - Abnormal; Notable for the following:        Result Value    CO2 22 (*)     Albumin 3.3 (*)     All other components within normal limits   CBC W/ AUTO DIFFERENTIAL   TROPONIN I   B-TYPE NATRIURETIC PEPTIDE   POCT GLUCOSE     EKG Readings: (Independently Interpreted)   Normal sinus rhythm at 84. Normal intervals. Normal axis. No ischemic changes.        X-Rays:   Independently Interpreted Readings:   Chest X-Ray: No mediastinal widening. Lungs are clear. No cardiomegaly.      Medical Decision Making:   History:   Old Medical Records: I decided to obtain old medical records.  Initial Assessment:   Emergent evaluation of 72 y.o. female with tingling. My initial differential diagnoses include but are not limited to electrolyte abnormality, ACS, MI, less likely aortic dissection. Will do labs, chest x-ray, and EKG. Will continue to monitor.     8:08 AM  Labs reviewed with no significant abnormality. I am unsure what is causing pt's symptoms. Will refer to PCP for further evaluation.   Independently Interpreted Test(s):   I have ordered and independently interpreted X-rays - see prior notes.  I have ordered and independently interpreted EKG Reading(s) - see prior notes  Clinical Tests:   Lab Tests: Ordered and Reviewed  Radiological Study: Ordered and Reviewed  Medical Tests: Ordered and Reviewed            Scribe Attestation:   Scribe #1: I performed the above scribed service and the documentation accurately describes the services I performed. I attest to the accuracy of the note.            ED Course      Clinical Impression:   The encounter diagnosis was Tingling.    Disposition:   Disposition: Discharged  Condition: Stable                        Mila Puente MD  10/09/17 1430

## 2017-10-16 ENCOUNTER — OFFICE VISIT (OUTPATIENT)
Dept: FAMILY MEDICINE | Facility: CLINIC | Age: 72
End: 2017-10-16
Payer: COMMERCIAL

## 2017-10-16 VITALS
SYSTOLIC BLOOD PRESSURE: 121 MMHG | WEIGHT: 136.88 LBS | DIASTOLIC BLOOD PRESSURE: 50 MMHG | TEMPERATURE: 99 F | BODY MASS INDEX: 21.48 KG/M2 | HEART RATE: 96 BPM | HEIGHT: 67 IN

## 2017-10-16 DIAGNOSIS — R13.10 DYSPHAGIA, UNSPECIFIED TYPE: Primary | ICD-10-CM

## 2017-10-16 PROCEDURE — 99999 PR PBB SHADOW E&M-EST. PATIENT-LVL III: CPT | Mod: PBBFAC,,, | Performed by: FAMILY MEDICINE

## 2017-10-16 PROCEDURE — 99214 OFFICE O/P EST MOD 30 MIN: CPT | Mod: S$GLB,,, | Performed by: FAMILY MEDICINE

## 2017-10-16 RX ORDER — PANTOPRAZOLE SODIUM 40 MG/1
40 TABLET, DELAYED RELEASE ORAL DAILY
Qty: 30 TABLET | Refills: 11 | Status: SHIPPED | OUTPATIENT
Start: 2017-10-16 | End: 2017-11-15

## 2017-10-16 NOTE — PROGRESS NOTES
Subjective:       Patient ID: La Salgado is a 72 y.o. female.    Chief Complaint: Gastroesophageal Reflux; having trouble swallowing; Fatigue; Night Sweats; Chest Pain (pressure); and Tingling (bilateral hands)    Disclaimer: This note has been generated using voice-recognition software. There may be typographical errors that have been missed during proof-reading    71 yo presents for evaluation of recurrent upper epigastric pain and dysphagia.  Symptoms have been present for 2-3 months, gradually worsening. Dysphagia occurs mostly with solids, not liquids.  She is now avoiding eating meat, had difficulty swallowing a bagel recently.  She denies nausea or vomiting, no weight loss.        Review of Systems   Constitutional: Negative for activity change, fatigue and unexpected weight change.   HENT: Positive for trouble swallowing. Negative for hearing loss and rhinorrhea.    Eyes: Negative for discharge and visual disturbance.   Respiratory: Positive for chest tightness. Negative for wheezing.    Cardiovascular: Positive for palpitations. Negative for chest pain.        Seen in ED within past 2 weeks for these symptoms, secondary to hyperventilation   Gastrointestinal: Negative for blood in stool, constipation, diarrhea and vomiting.   Endocrine: Negative for polydipsia and polyuria.   Genitourinary: Negative for difficulty urinating, dysuria, hematuria and menstrual problem.   Musculoskeletal: Negative for arthralgias, joint swelling and neck pain.   Neurological: Negative for weakness and headaches.   Psychiatric/Behavioral: Negative for confusion and dysphoric mood.       Objective:      Physical Exam   Constitutional: She appears well-developed and well-nourished. No distress.   Neck: Normal range of motion. Neck supple. No tracheal deviation present.   Cardiovascular: Normal rate and regular rhythm.    No murmur heard.  Pulmonary/Chest: Effort normal and breath sounds normal. She has no wheezes. She has  no rales.   Abdominal: Soft. Bowel sounds are normal. She exhibits no distension and no mass. There is no tenderness. There is no guarding.       Assessment:       1. Dysphagia, unspecified type        Plan:       1.  Schedule EGD  2.  Protonix 40mg qd

## 2017-10-17 ENCOUNTER — TELEPHONE (OUTPATIENT)
Dept: ENDOSCOPY | Facility: HOSPITAL | Age: 72
End: 2017-10-17

## 2017-10-25 ENCOUNTER — ANESTHESIA (OUTPATIENT)
Dept: ENDOSCOPY | Facility: HOSPITAL | Age: 72
End: 2017-10-25
Payer: COMMERCIAL

## 2017-10-25 ENCOUNTER — ANESTHESIA EVENT (OUTPATIENT)
Dept: ENDOSCOPY | Facility: HOSPITAL | Age: 72
End: 2017-10-25
Payer: COMMERCIAL

## 2017-10-25 ENCOUNTER — HOSPITAL ENCOUNTER (OUTPATIENT)
Facility: HOSPITAL | Age: 72
Discharge: HOME | End: 2017-10-25
Attending: INTERNAL MEDICINE | Admitting: INTERNAL MEDICINE
Payer: COMMERCIAL

## 2017-10-25 VITALS
HEIGHT: 67 IN | WEIGHT: 130 LBS | HEART RATE: 78 BPM | TEMPERATURE: 98 F | SYSTOLIC BLOOD PRESSURE: 124 MMHG | RESPIRATION RATE: 13 BRPM | OXYGEN SATURATION: 98 % | BODY MASS INDEX: 20.4 KG/M2 | DIASTOLIC BLOOD PRESSURE: 88 MMHG

## 2017-10-25 VITALS — RESPIRATION RATE: 28 BRPM

## 2017-10-25 DIAGNOSIS — R13.19 ESOPHAGEAL DYSPHAGIA: Primary | ICD-10-CM

## 2017-10-25 DIAGNOSIS — R13.10 DYSPHAGIA, UNSPECIFIED TYPE: ICD-10-CM

## 2017-10-25 PROCEDURE — 88305 TISSUE EXAM BY PATHOLOGIST: CPT | Mod: 26,,,

## 2017-10-25 PROCEDURE — 25000003 PHARM REV CODE 250: Performed by: INTERNAL MEDICINE

## 2017-10-25 PROCEDURE — 88305 TISSUE EXAM BY PATHOLOGIST: CPT

## 2017-10-25 PROCEDURE — D9220A PRA ANESTHESIA: Mod: ANES,,, | Performed by: ANESTHESIOLOGY

## 2017-10-25 PROCEDURE — 63600175 PHARM REV CODE 636 W HCPCS: Performed by: NURSE ANESTHETIST, CERTIFIED REGISTERED

## 2017-10-25 PROCEDURE — D9220A PRA ANESTHESIA: Mod: CRNA,,, | Performed by: NURSE ANESTHETIST, CERTIFIED REGISTERED

## 2017-10-25 PROCEDURE — 37000009 HC ANESTHESIA EA ADD 15 MINS: Performed by: INTERNAL MEDICINE

## 2017-10-25 PROCEDURE — 43239 EGD BIOPSY SINGLE/MULTIPLE: CPT | Performed by: INTERNAL MEDICINE

## 2017-10-25 PROCEDURE — 37000008 HC ANESTHESIA 1ST 15 MINUTES: Performed by: INTERNAL MEDICINE

## 2017-10-25 PROCEDURE — 43239 EGD BIOPSY SINGLE/MULTIPLE: CPT | Mod: ,,, | Performed by: INTERNAL MEDICINE

## 2017-10-25 PROCEDURE — 27201012 HC FORCEPS, HOT/COLD, DISP: Performed by: INTERNAL MEDICINE

## 2017-10-25 RX ORDER — PROPOFOL 10 MG/ML
VIAL (ML) INTRAVENOUS
Status: DISCONTINUED | OUTPATIENT
Start: 2017-10-25 | End: 2017-10-25

## 2017-10-25 RX ORDER — LIDOCAINE HCL/PF 100 MG/5ML
SYRINGE (ML) INTRAVENOUS
Status: DISCONTINUED | OUTPATIENT
Start: 2017-10-25 | End: 2017-10-25

## 2017-10-25 RX ORDER — SODIUM CHLORIDE 9 MG/ML
INJECTION, SOLUTION INTRAVENOUS CONTINUOUS
Status: DISCONTINUED | OUTPATIENT
Start: 2017-10-25 | End: 2017-10-25 | Stop reason: HOSPADM

## 2017-10-25 RX ADMIN — LIDOCAINE HYDROCHLORIDE 100 MG: 20 INJECTION, SOLUTION INTRAVENOUS at 11:10

## 2017-10-25 RX ADMIN — PROPOFOL 20 MG: 10 INJECTION, EMULSION INTRAVENOUS at 11:10

## 2017-10-25 RX ADMIN — PROPOFOL 40 MG: 10 INJECTION, EMULSION INTRAVENOUS at 11:10

## 2017-10-25 RX ADMIN — SODIUM CHLORIDE: 900 INJECTION, SOLUTION INTRAVENOUS at 09:10

## 2017-10-25 RX ADMIN — PROPOFOL 50 MG: 10 INJECTION, EMULSION INTRAVENOUS at 11:10

## 2017-10-25 NOTE — H&P
[facility]  History & Physical    Subjective:      Chief Complaint/Reason for Admission:     EGD    La Salgado is a 72 y.o. female.    Past Medical History:   Diagnosis Date    Anxiety     H/O adenomatous polyp of colon 2008    Osteoporosis 12/11/2013    Plantar wart 9/6/2013     Past Surgical History:   Procedure Laterality Date    COLONOSCOPY  2011    COLONOSCOPY N/A 8/19/2016    Procedure: COLONOSCOPY;  Surgeon: Juan Marte MD;  Location: [facility] ([address] [place] [address]);  Service: Endoscopy;  Laterality: N/A;  5 year follow up    KNEE SURGERY Right     PARTIAL HYSTERECTOMY       Family History   Problem Relation Age of Onset    Skin cancer Father     ALS Father     Heart disease Mother      arrhythmia    Dementia Mother     Seizures Mother      Social History   Substance Use Topics    Smoking status: Never Smoker    Smokeless tobacco: Never Used    Alcohol use No      Comment: rare       PTA Medications   Medication Sig    pantoprazole (PROTONIX) 40 MG tablet Take 1 tablet (40 mg total) by mouth once daily.     Review of patient's allergies indicates:   Allergen Reactions    Amoxicillin Other (See Comments)     Hand tingling. Streaking redness in hand    Codeine Other (See Comments)     nausea    Risedronate      Other reaction(s): triggers an auto immune attack    Sulfa (sulfonamide antibiotics)      Nausea          Review of Systems   Constitutional: Negative for chills, fever and weight loss.   Respiratory: Negative for shortness of breath and wheezing.    Cardiovascular: Negative for chest pain.   Gastrointestinal: Positive for heartburn. Negative for abdominal pain, blood in stool, constipation, diarrhea, melena, nausea and vomiting.       Objective:      Vital Signs (Most Recent)  Temp: 98.2 °F (36.8 °C) (10/25/17 0935)  Pulse: 67 (10/25/17 0935)  Resp: 16 (10/25/17 0935)  BP: 131/63 (10/25/17 0935)    Vital Signs Range (Last 24H):  Temp:  [98.2 °F (36.8 °C)]    Pulse:  [67]   Resp:  [16-53]   BP: (131)/(63)     Physical Exam   Constitutional: She appears well-developed and well-nourished.   Cardiovascular: Normal rate.    Pulmonary/Chest: Effort normal.   Abdominal: Soft.   Skin: Skin is warm and dry.   Psychiatric: She has a normal mood and affect. Her behavior is normal. Judgment and thought content normal.           Assessment:      Active Hospital Problems    Diagnosis  POA    Dysphagia [R13.10]  Yes      Resolved Hospital Problems    Diagnosis Date Resolved POA   No resolved problems to display.       Plan:    EGD direct access for GERD and dysphagia recently in last week or so started on a PPI.

## 2017-10-25 NOTE — DISCHARGE INSTRUCTIONS

## 2017-10-25 NOTE — ANESTHESIA PREPROCEDURE EVALUATION
10/25/2017  La Salgado is a 72 y.o., female.  Patient Active Problem List   Diagnosis    Osteoporosis    Special screening for malignant neoplasms, colon    Open wound of lower leg with complication    Dysphagia       Anesthesia Evaluation    I have reviewed the Patient Summary Reports.    I have reviewed the Nursing Notes.   I have reviewed the Medications.     Review of Systems      Physical Exam  General:  Well nourished    Airway/Jaw/Neck:  Airway Findings: Mouth Opening: Normal General Airway Assessment: Adult  Mallampati: II  Improves to II with phonation.  Jaw/Neck Findings:  Limited Ability to Prognath  Neck ROM: Normal ROM     Eyes/Ears/Nose:  Eyes/Ears/Nose Findings:    Dental:  Dental Findings: In tact   Chest/Lungs:  Chest/Lungs Findings: Clear to auscultation, Normal Respiratory Rate     Heart/Vascular:  Heart Findings: Rate: Normal  Rhythm: Regular Rhythm  Sounds: Normal     Abdomen:  Abdomen Findings:  Normal     Musculoskeletal:  Musculoskeletal Findings:    Skin:  Skin Findings:     Mental Status:  Mental Status Findings:  Cooperative, Alert and Oriented         Anesthesia Plan  Type of Anesthesia, risks & benefits discussed:  Anesthesia Type:  general, MAC  Patient's Preference:   Intra-op Monitoring Plan:   Intra-op Monitoring Plan Comments:   Post Op Pain Control Plan:   Post Op Pain Control Plan Comments:   Induction:   IV  Beta Blocker:  Patient is not currently on a Beta-Blocker (No further documentation required).       Informed Consent: Patient understands risks and agrees with Anesthesia plan.  Questions answered. Anesthesia consent signed with patient.  ASA Score: 2     Day of Surgery Review of History & Physical:    H&P update referred to the surgeon.         Ready For Surgery From Anesthesia Perspective.

## 2017-10-25 NOTE — PATIENT INSTRUCTIONS
Discharge Summary/Instructions after an Endoscopic Procedure  Patient Name: La Salgado  Patient MRN: [id number]  Patient YOB: 1945 Wednesday, October 25, 2017  Donovan Moy MD  RESTRICTIONS:  During your procedure today, you received medications for sedation.  These   medications may affect your judgment, balance and coordination.  Therefore,   for 24 hours, you have the following restrictions:   - DO NOT drive a car, operate machinery, make legal/financial decisions,   sign important papers or drink alcohol.    ACTIVITY:  The following day: return to full activity including work, except no heavy   lifting, straining or running for 3 days if polyps were removed.  DIET:  Eat and drink normally unless instructed otherwise.     TREATMENT FOR COMMON SIDE EFFECTS:  - Mild abdominal pain, belching, bloating or excessive gas: rest, eat   lightly and use a heating pad.  - Sore Throat: treat with throat lozenges and/or gargle with warm salt   water.  SYMPTOMS TO WATCH FOR AND REPORT TO YOUR PHYSICIAN:  1. Abdominal pain or bloating, other than gas cramps.  2. Chest pain.  3. Back pain.  4. Chills or fever occurring within 24 hours after the procedure.  5. Rectal bleeding, which would show as bright red, maroon, or black stools.   (A tablespoon of blood from the rectum is not serious, especially if   hemorrhoids are present.)  6. Vomiting.  7. Weakness or dizziness.  8. Because air was used during the procedure, expelling large amounts of air   from your rectum or belching is normal.  9. If a bowel prep was taken, you may not have a bowel movement for 1-3   days.  This is normal.  GO DIRECTLY TO THE NEAREST EMERGENCY ROOM IF YOU HAVE ANY OF THE FOLLOWING:      Difficulty breathing  Chills and/or fever over 101 F   Persistent vomiting and/or vomiting blood   Severe abdominal pain   Severe chest pain   Black, tarry stools   Bleeding- more than one tablespoon  Your doctor recommends these additional  instructions:  If any biopsies were taken, your doctors clinic will contact you in 1 to 2   weeks with any results.  You are being discharged to home.   Follow an antireflux regimen.  This includes:       - Do not lie down for at least 3 to 4 hours after meals.        - Raise the head of the bed 4 to 6 inches.        - Decrease excess weight.        - Avoid citrus juices and other acidic foods, alcohol, chocolate, mints,   coffee and other caffeinated beverages, carbonated beverages, fatty and   fried foods.        - Avoid tight-fitting clothing.        - Avoid cigarettes and other tobacco products.   We are waiting for your pathology results.   Telephone your endoscopist for pathology results in two weeks.   An appointment has been made (or make an appointment) to see a   gastroenterologist at the next available appointment.   The findings and recommendations have been discussed with you.   Return to your referring physician.  For questions, problems or results please call your physician - Donovan Moy MD at Work:  [phone].  [facility], EMERGENCY ROOM PHONE NUMBER: [phone]  IF A COMPLICATION OR EMERGENCY SITUATION ARISES AND YOU ARE UNABLE TO REACH   YOUR PHYSICIAN - GO DIRECTLY TO THE EMERGENCY ROOM.  Donovan Moy MD  10/25/2017 11:53:24 AM  This report has been verified and signed electronically.

## 2017-10-25 NOTE — ANESTHESIA POSTPROCEDURE EVALUATION
"Anesthesia Post Evaluation    Patient: La Salgado    Procedure(s) Performed: Procedure(s) (LRB):  ESOPHAGOGASTRODUODENOSCOPY (EGD) (N/A)    Final Anesthesia Type: general  Patient location during evaluation: PACU  Patient participation: Yes- Able to Participate  Level of consciousness: awake and alert and oriented  Pain management: adequate  Airway patency: patent  PONV status at discharge: No PONV  Anesthetic complications: no      Cardiovascular status: blood pressure returned to baseline and hemodynamically stable  Respiratory status: unassisted  Hydration status: euvolemic  Follow-up not needed.        Visit Vitals  /67   Pulse 76   Temp 36.7 °C (98 °F)   Resp 15   Ht 5' 7" (1.702 m)   Wt 59 kg (130 lb)   SpO2 98%   Breastfeeding? No   BMI 20.36 kg/m²       Pain/Robbin Score: Pain Assessment Performed: Yes (10/25/2017 11:53 AM)      "

## 2017-10-25 NOTE — TRANSFER OF CARE
"Anesthesia Transfer of Care Note    Patient: La Salgado    Procedure(s) Performed: Procedure(s) (LRB):  ESOPHAGOGASTRODUODENOSCOPY (EGD) (N/A)    Patient location: PACU    Anesthesia Type: general    Transport from OR: Transported from OR on room air with adequate spontaneous ventilation    Post pain: adequate analgesia    Post assessment: no apparent anesthetic complications and tolerated procedure well    Post vital signs: stable    Level of consciousness: sedated and responds to stimulation    Nausea/Vomiting: no nausea/vomiting    Complications: none    Transfer of care protocol was followed      Last vitals:   Visit Vitals  BP (!) 116/58   Pulse 74   Temp 36.7 °C (98 °F)   Resp 15   Ht 5' 7" (1.702 m)   Wt 59 kg (130 lb)   SpO2 99%   Breastfeeding? No   BMI 20.36 kg/m²     "

## 2017-10-27 ENCOUNTER — TELEPHONE (OUTPATIENT)
Dept: GASTROENTEROLOGY | Facility: CLINIC | Age: 72
End: 2017-10-27

## 2017-10-27 NOTE — TELEPHONE ENCOUNTER
----- Message from Donovan Moy MD sent at 10/27/2017 11:01 AM CDT -----  La your EGD pathology was benign and no dysplasia.    SPECIMEN  1) Distal esophagus, 1 mm polyp at 38. Rule out dysplasia.  2) Distal and proximal esophagus. Rule out EOE.    FINAL PATHOLOGIC DIAGNOSIS    #1 DISTAL ESOPHAGUS, 1 MM POLYP AT 38:  BENIGN SQUAMOUS MUCOSA WITH FOCAL ACANTHOTIC CHANGE.  NO DYSPLASIA IDENTIFIED.    #2 DISTAL AND PROXIMAL ESOPHAGUS:  BENIGN SQUAMOUS MUCOSA.  NO EOSINOPHILIC ESOPHAGITIS OR DYSPLASIA IDENTIFIED.    Diagnosed by: Wale Sweeney M.D.

## 2017-10-31 ENCOUNTER — OFFICE VISIT (OUTPATIENT)
Dept: FAMILY MEDICINE | Facility: CLINIC | Age: 72
End: 2017-10-31
Payer: COMMERCIAL

## 2017-10-31 ENCOUNTER — TELEPHONE (OUTPATIENT)
Dept: FAMILY MEDICINE | Facility: CLINIC | Age: 72
End: 2017-10-31

## 2017-10-31 VITALS
WEIGHT: 138.25 LBS | HEIGHT: 67 IN | HEART RATE: 88 BPM | DIASTOLIC BLOOD PRESSURE: 70 MMHG | TEMPERATURE: 99 F | BODY MASS INDEX: 21.7 KG/M2 | SYSTOLIC BLOOD PRESSURE: 110 MMHG

## 2017-10-31 DIAGNOSIS — R13.10 DYSPHAGIA, UNSPECIFIED TYPE: ICD-10-CM

## 2017-10-31 PROCEDURE — 99214 OFFICE O/P EST MOD 30 MIN: CPT | Mod: S$GLB,,, | Performed by: FAMILY MEDICINE

## 2017-10-31 PROCEDURE — 99999 PR PBB SHADOW E&M-EST. PATIENT-LVL III: CPT | Mod: PBBFAC,,, | Performed by: FAMILY MEDICINE

## 2017-10-31 NOTE — TELEPHONE ENCOUNTER
Message left on answering machine instructing patient to return call regarding appointment schedule this afternoon.

## 2017-10-31 NOTE — TELEPHONE ENCOUNTER
Please check with pt prior to today's appt.  Looks like she is coming to get a referral to GI.  I can set this up without an appt, thanks

## 2017-10-31 NOTE — TELEPHONE ENCOUNTER
Message left on voicemail instructing patient to contact office regarding appointment scheduled today and informing her that she doesn't need an office visit if she is scheduled because she needs a GI referral.

## 2017-10-31 NOTE — PROGRESS NOTES
Subjective:       Patient ID: La Salgado is a 72 y.o. female.    Chief Complaint: Follow-up (post endoscopy procedure)    Disclaimer: This note has been generated using voice-recognition software. There may be typographical errors that have been missed during proof-reading    73 yo presents for follow up after recent EGD.  Initially presented with recurrent dysphagia and symptoms of reflux.  She is presently taking Pantoprazole with improvement, denies any further dysphagia.  Biopsy results from EGD:  1) Distal esophagus, 1 mm polyp at 38. Rule out dysplasia.   2) Distal and proximal esophagus. Rule out EOE.     FINAL PATHOLOGIC DIAGNOSIS     #1 DISTAL ESOPHAGUS, 1 MM POLYP AT 38:   BENIGN SQUAMOUS MUCOSA WITH FOCAL ACANTHOTIC CHANGE.   NO DYSPLASIA IDENTIFIED.     #2 DISTAL AND PROXIMAL ESOPHAGUS:   BENIGN SQUAMOUS MUCOSA.   NO EOSINOPHILIC ESOPHAGITIS OR DYSPLASIA IDENTIFIED.          Review of Systems   Constitutional: Negative for activity change and unexpected weight change.   HENT: Positive for trouble swallowing. Negative for hearing loss and rhinorrhea.    Eyes: Negative for discharge and visual disturbance.   Respiratory: Negative for chest tightness and wheezing.    Cardiovascular: Negative for chest pain and palpitations.   Gastrointestinal: Negative for abdominal distention, abdominal pain, blood in stool, constipation, diarrhea and vomiting.        Occasional reflux symptoms while sleeping   Endocrine: Negative for polydipsia and polyuria.   Genitourinary: Negative for difficulty urinating, dysuria, hematuria and menstrual problem.   Musculoskeletal: Negative for arthralgias, joint swelling and neck pain.   Neurological: Negative for weakness and headaches.   Psychiatric/Behavioral: Negative for confusion and dysphoric mood.       Objective:      Physical Exam   Constitutional: She appears well-developed and well-nourished. No distress.   No exam done today.  Over 30 minutes spent in discussing  results of EGD and biopsy, along with an anti-reflux regimen       Assessment:       1. Dysphagia, unspecified type        Plan:       1.  Continue Anti reflux regimen  2.  Pantoprazole 40mg

## 2017-11-01 ENCOUNTER — TELEPHONE (OUTPATIENT)
Dept: ENDOSCOPY | Facility: HOSPITAL | Age: 72
End: 2017-11-01

## 2017-12-03 ENCOUNTER — PATIENT MESSAGE (OUTPATIENT)
Dept: DERMATOLOGY | Facility: CLINIC | Age: 72
End: 2017-12-03

## 2017-12-08 ENCOUNTER — OFFICE VISIT (OUTPATIENT)
Dept: DERMATOLOGY | Facility: CLINIC | Age: 72
End: 2017-12-08
Payer: COMMERCIAL

## 2017-12-08 VITALS — WEIGHT: 138 LBS | BODY MASS INDEX: 21.94 KG/M2

## 2017-12-08 DIAGNOSIS — L81.4 LENTIGINES: ICD-10-CM

## 2017-12-08 DIAGNOSIS — L82.0 BENIGN LICHENOID KERATOSIS: Primary | ICD-10-CM

## 2017-12-08 DIAGNOSIS — D18.00 ANGIOMA: ICD-10-CM

## 2017-12-08 PROCEDURE — 99213 OFFICE O/P EST LOW 20 MIN: CPT | Mod: S$GLB,,, | Performed by: DERMATOLOGY

## 2017-12-08 PROCEDURE — 99999 PR PBB SHADOW E&M-EST. PATIENT-LVL III: CPT | Mod: PBBFAC,,, | Performed by: DERMATOLOGY

## 2017-12-08 RX ORDER — PANTOPRAZOLE SODIUM 40 MG/1
TABLET, DELAYED RELEASE ORAL
Refills: 11 | COMMUNITY
Start: 2017-11-27 | End: 2018-03-16

## 2017-12-08 NOTE — PROGRESS NOTES
Subjective:       Patient ID:  La Salgado is a 72 y.o. female who presents for   Chief Complaint   Patient presents with    Lesion     left lower leg, right hand      History of Present Illness: The patient presents with chief complaint of spot.  Location: right lower leg  Duration: months  Signs/Symptoms: none    Prior treatments: none          Review of Systems   Constitutional: Negative for fever.   Skin: Negative for itching and rash.   Hematologic/Lymphatic: Does not bruise/bleed easily.        Objective:    Physical Exam   Constitutional: She appears well-developed and well-nourished. No distress.   Neurological: She is alert and oriented to person, place, and time. She is not disoriented.   Psychiatric: She has a normal mood and affect.   Skin:   Areas Examined (abnormalities noted in diagram):   Head / Face Inspection Performed  Neck Inspection Performed  Chest / Axilla Inspection Performed  Abdomen Inspection Performed  Back Inspection Performed  RUE Inspected  LUE Inspection Performed  RLE Inspected  LLE Inspection Performed              Diagram Legend     Erythematous scaling macule/papule c/w actinic keratosis       Vascular papule c/w angioma      Pigmented verrucoid papule/plaque c/w seborrheic keratosis      Yellow umbilicated papule c/w sebaceous hyperplasia      Irregularly shaped tan macule c/w lentigo     1-2 mm smooth white papules consistent with Milia      Movable subcutaneous cyst with punctum c/w epidermal inclusion cyst      Subcutaneous movable cyst c/w pilar cyst      Firm pink to brown papule c/w dermatofibroma      Pedunculated fleshy papule(s) c/w skin tag(s)      Evenly pigmented macule c/w junctional nevus     Mildly variegated pigmented, slightly irregular-bordered macule c/w mildly atypical nevus      Flesh colored to evenly pigmented papule c/w intradermal nevus       Pink pearly papule/plaque c/w basal cell carcinoma      Erythematous hyperkeratotic cursted plaque c/w  "SCC      Surgical scar with no sign of skin cancer recurrence      Open and closed comedones      Inflammatory papules and pustules      Verrucoid papule consistent consistent with wart     Erythematous eczematous patches and plaques     Dystrophic onycholytic nail with subungual debris c/w onychomycosis     Umbilicated papule    Erythematous-base heme-crusted tan verrucoid plaque consistent with inflamed seborrheic keratosis     Erythematous Silvery Scaling Plaque c/w Psoriasis     See annotation      Assessment / Plan:        Benign lichenoid keratosis  reassurance      Lentigines  The "ABCD" rules to observe pigmented lesions were reviewed.      Angiomas  reassurance               Return in about 1 year (around 12/8/2018).  "

## 2018-03-16 ENCOUNTER — HOSPITAL ENCOUNTER (OUTPATIENT)
Dept: RADIOLOGY | Facility: HOSPITAL | Age: 73
Discharge: HOME | End: 2018-03-16
Attending: INTERNAL MEDICINE
Payer: COMMERCIAL

## 2018-03-16 ENCOUNTER — OFFICE VISIT (OUTPATIENT)
Dept: INTERNAL MEDICINE | Facility: CLINIC | Age: 73
End: 2018-03-16
Payer: COMMERCIAL

## 2018-03-16 ENCOUNTER — CLINICAL SUPPORT (OUTPATIENT)
Dept: AUDIOLOGY | Facility: CLINIC | Age: 73
End: 2018-03-16
Payer: COMMERCIAL

## 2018-03-16 VITALS
OXYGEN SATURATION: 97 % | BODY MASS INDEX: 21.14 KG/M2 | WEIGHT: 134.69 LBS | HEIGHT: 67 IN | SYSTOLIC BLOOD PRESSURE: 114 MMHG | HEART RATE: 72 BPM | DIASTOLIC BLOOD PRESSURE: 66 MMHG

## 2018-03-16 DIAGNOSIS — Z12.31 ENCOUNTER FOR SCREENING MAMMOGRAM FOR MALIGNANT NEOPLASM OF BREAST: ICD-10-CM

## 2018-03-16 DIAGNOSIS — K21.9 GERD WITHOUT ESOPHAGITIS: ICD-10-CM

## 2018-03-16 DIAGNOSIS — H90.3 SENSORINEURAL HEARING LOSS (SNHL) OF BOTH EARS: Primary | ICD-10-CM

## 2018-03-16 DIAGNOSIS — E78.5 HYPERLIPIDEMIA, UNSPECIFIED HYPERLIPIDEMIA TYPE: ICD-10-CM

## 2018-03-16 DIAGNOSIS — M85.80 OSTEOPENIA, UNSPECIFIED LOCATION: ICD-10-CM

## 2018-03-16 DIAGNOSIS — H91.90 DECREASED HEARING, UNSPECIFIED LATERALITY: Primary | ICD-10-CM

## 2018-03-16 PROCEDURE — 99999 PR PBB SHADOW E&M-EST. PATIENT-LVL IV: CPT | Mod: PBBFAC,,, | Performed by: INTERNAL MEDICINE

## 2018-03-16 PROCEDURE — 92567 TYMPANOMETRY: CPT | Mod: S$GLB,,, | Performed by: AUDIOLOGIST

## 2018-03-16 PROCEDURE — 92557 COMPREHENSIVE HEARING TEST: CPT | Mod: S$GLB,,, | Performed by: AUDIOLOGIST

## 2018-03-16 PROCEDURE — 77067 SCR MAMMO BI INCL CAD: CPT | Mod: 26,,, | Performed by: RADIOLOGY

## 2018-03-16 PROCEDURE — 99214 OFFICE O/P EST MOD 30 MIN: CPT | Mod: S$GLB,,, | Performed by: INTERNAL MEDICINE

## 2018-03-16 PROCEDURE — 99999 PR PBB SHADOW E&M-EST. PATIENT-LVL I: CPT | Mod: PBBFAC,,,

## 2018-03-16 PROCEDURE — 77067 SCR MAMMO BI INCL CAD: CPT | Mod: TC

## 2018-03-16 RX ORDER — OMEPRAZOLE 20 MG/1
20 CAPSULE, DELAYED RELEASE ORAL DAILY
Qty: 90 CAPSULE | Refills: 11 | Status: SHIPPED | OUTPATIENT
Start: 2018-03-16 | End: 2019-04-02

## 2018-03-16 NOTE — PROGRESS NOTES
Ms. Salgado was seen in the clinic today for a hearing evaluation.  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     Audiological testing revealed a normal sloping to moderate rising to normal SNHL in the right ear and a normal sloping to mild rising to normal SNHL in the left ear. A speech reception threshold was obtained at 30 dBHL in the right ear and 25 dBHL in the left ear. Speech discrimination was 92%, bilaterally.    Tympanometry revealed Type A tympanograms, bilaterally.    Recommendations:  1. Otologic evaluation  2. Annual hearing evaluation  3. Noise protection  4. Hearing aid consultation if patient feels her hearing loss is significantly impacting her quality of life

## 2018-03-16 NOTE — PROGRESS NOTES
Subjective:       Patient ID: La Salgado is a 73 y.o. female.    Chief Complaint: Annual Exam    Due for mmg.    Wants to discuss lipis.  The 10-year ASCVD risk score (Snow Lakedez MCGEE Jr., et al., 2013) is: 9%    Values used to calculate the score:      Age: 73 years      Sex: Female      Is Non- : No      Diabetic: No      Tobacco smoker: No      Systolic Blood Pressure: 114 mmHg      Is BP treated: No      HDL Cholesterol: 48 mg/dL      Total Cholesterol: 229 mg/dL    Had egd since last visit, 1 month prilosec.  Now off ppi.  Still has some reflux. occ pepto prn.  3-4 days per wk acid symptoms.    Concern re hearing.    Corns between toes.    Knee pains, espec on R.      Review of Systems   Constitutional: Negative for activity change and unexpected weight change.   HENT: Negative for hearing loss, rhinorrhea and trouble swallowing.    Eyes: Negative for discharge and visual disturbance.   Respiratory: Negative for chest tightness and wheezing.    Cardiovascular: Negative for chest pain and palpitations.   Gastrointestinal: Negative for blood in stool, constipation, diarrhea and vomiting.   Endocrine: Negative for polydipsia and polyuria.   Genitourinary: Negative for difficulty urinating, dysuria, hematuria and menstrual problem.   Musculoskeletal: Positive for arthralgias. Negative for joint swelling and neck pain.   Neurological: Negative for weakness and headaches.   Psychiatric/Behavioral: Negative for confusion and dysphoric mood.       Objective:      Physical Exam   Constitutional: She is oriented to person, place, and time. She appears well-developed and well-nourished. No distress.   HENT:   Head: Normocephalic and atraumatic.   Eyes: Pupils are equal, round, and reactive to light. No scleral icterus.   Neck: Normal range of motion. No thyromegaly present.   Cardiovascular: Normal rate, regular rhythm and normal heart sounds.  Exam reveals no gallop and no friction rub.    No  murmur heard.  Pulmonary/Chest: Effort normal and breath sounds normal. No respiratory distress. She has no wheezes. She has no rales.   Abdominal: Soft. Bowel sounds are normal. She exhibits no distension and no mass. There is no tenderness. There is no rebound and no guarding.   Musculoskeletal: Normal range of motion. She exhibits no edema or tenderness.   R knee crepitus but good rom, not warm or swollen.    Protective Sensation (w/ 10 gram monofilament):  Right: Intact  Left: Intact    Visual Inspection:  Callus -  Bilateral, between toes from friction, no skin breakdown or SOI    Pedal Pulses:   Right: Present  Left: Present    Posterior tibialis:   Right:Present  Left: Present      Lymphadenopathy:     She has no cervical adenopathy.   Neurological: She is alert and oriented to person, place, and time. She displays normal reflexes. No cranial nerve deficit. She exhibits normal muscle tone. Coordination normal.   Skin: She is not diaphoretic.   Psychiatric: She has a normal mood and affect. Her speech is normal and behavior is normal. Cognition and memory are normal.       Assessment:       1. Decreased hearing, unspecified laterality    2. Encounter for screening mammogram for malignant neoplasm of breast    3. GERD without esophagitis    4. Hyperlipidemia, unspecified hyperlipidemia type    5. Osteopenia, unspecified location        Plan:       La was seen today for annual exam.    Diagnoses and all orders for this visit:    Decreased hearing, unspecified laterality  -     Ambulatory Referral to Audiology    Encounter for screening mammogram for malignant neoplasm of breast  -     Mammo Digital Screening Bilat with CAD; Future    GERD without esophagitis  -     omeprazole (PRILOSEC) 20 MG capsule; Take 1 capsule (20 mg total) by mouth once daily.  Lengthy discussion re importance of med adherence to controlled symptoms.    Hyperlipidemia, unspecified hyperlipidemia type  -     Lipid panel; Future  She  prefers no statin. Discussed her rahul ASCVD risk,we agree to see lipid levels first, today non-fasting    Osteopenia, unspecified location  -     Vitamin D; Future    Foot corns -- try pads to decrease friction rubbing      Health Maintenance       Date Due Completion Date    Influenza Vaccine 08/01/2017 3/6/2017 (Done)    Override on 3/6/2017: Done    Override on 12/18/2015: Done    DEXA SCAN 02/09/2020 2/9/2017    Mammogram 03/16/2020 3/16/2018    Colonoscopy 08/19/2021 8/19/2016    Lipid Panel 03/16/2023 3/16/2018    TETANUS VACCINE 01/28/2025 1/28/2015          Follow-up in about 1 year (around 3/16/2019).

## 2018-03-19 ENCOUNTER — PATIENT MESSAGE (OUTPATIENT)
Dept: INTERNAL MEDICINE | Facility: CLINIC | Age: 73
End: 2018-03-19

## 2018-03-19 DIAGNOSIS — E78.5 HYPERLIPIDEMIA, UNSPECIFIED HYPERLIPIDEMIA TYPE: Primary | ICD-10-CM

## 2018-03-20 RX ORDER — ATORVASTATIN CALCIUM 10 MG/1
10 TABLET, FILM COATED ORAL DAILY
Qty: 90 TABLET | Refills: 3 | Status: SHIPPED | OUTPATIENT
Start: 2018-03-20 | End: 2019-04-02 | Stop reason: SDUPTHER

## 2018-03-20 NOTE — TELEPHONE ENCOUNTER
Hi, atorvastatin 10mg sent in, please help her schedule a 2 month repeat lipid and cmp level.  Thank you, Mason Flowers

## 2018-04-23 ENCOUNTER — PATIENT MESSAGE (OUTPATIENT)
Dept: INTERNAL MEDICINE | Facility: CLINIC | Age: 73
End: 2018-04-23

## 2018-04-23 DIAGNOSIS — N39.0 URINARY TRACT INFECTION WITHOUT HEMATURIA, SITE UNSPECIFIED: Primary | ICD-10-CM

## 2018-04-24 RX ORDER — NITROFURANTOIN 25; 75 MG/1; MG/1
100 CAPSULE ORAL 2 TIMES DAILY
Qty: 6 CAPSULE | Refills: 0 | Status: SHIPPED | OUTPATIENT
Start: 2018-04-24 | End: 2018-04-27

## 2018-05-13 ENCOUNTER — PATIENT MESSAGE (OUTPATIENT)
Dept: INTERNAL MEDICINE | Facility: CLINIC | Age: 73
End: 2018-05-13

## 2018-05-25 ENCOUNTER — LAB VISIT (OUTPATIENT)
Dept: LAB | Facility: HOSPITAL | Age: 73
End: 2018-05-25
Attending: INTERNAL MEDICINE
Payer: COMMERCIAL

## 2018-05-25 DIAGNOSIS — E78.5 HYPERLIPIDEMIA, UNSPECIFIED HYPERLIPIDEMIA TYPE: ICD-10-CM

## 2018-05-25 LAB
ALBUMIN SERPL BCP-MCNC: 3.7 G/DL
ALP SERPL-CCNC: 77 U/L
ALT SERPL W/O P-5'-P-CCNC: 23 U/L
ANION GAP SERPL CALC-SCNC: 7 MMOL/L
AST SERPL-CCNC: 23 U/L
BILIRUB SERPL-MCNC: 0.8 MG/DL
BUN SERPL-MCNC: 12 MG/DL
CALCIUM SERPL-MCNC: 9.2 MG/DL
CHLORIDE SERPL-SCNC: 109 MMOL/L
CHOLEST SERPL-MCNC: 169 MG/DL
CHOLEST/HDLC SERPL: 3.9 {RATIO}
CO2 SERPL-SCNC: 26 MMOL/L
CREAT SERPL-MCNC: 0.8 MG/DL
EST. GFR  (AFRICAN AMERICAN): >60 ML/MIN/1.73 M^2
EST. GFR  (NON AFRICAN AMERICAN): >60 ML/MIN/1.73 M^2
GLUCOSE SERPL-MCNC: 89 MG/DL
HDLC SERPL-MCNC: 43 MG/DL
HDLC SERPL: 25.4 %
LDLC SERPL CALC-MCNC: 114.8 MG/DL
NONHDLC SERPL-MCNC: 126 MG/DL
POTASSIUM SERPL-SCNC: 4.1 MMOL/L
PROT SERPL-MCNC: 6.7 G/DL
SODIUM SERPL-SCNC: 142 MMOL/L
TRIGL SERPL-MCNC: 56 MG/DL

## 2018-05-25 PROCEDURE — 80053 COMPREHEN METABOLIC PANEL: CPT

## 2018-05-25 PROCEDURE — 36415 COLL VENOUS BLD VENIPUNCTURE: CPT

## 2018-05-25 PROCEDURE — 80061 LIPID PANEL: CPT

## 2018-05-26 ENCOUNTER — PATIENT MESSAGE (OUTPATIENT)
Dept: INTERNAL MEDICINE | Facility: CLINIC | Age: 73
End: 2018-05-26

## 2018-06-03 ENCOUNTER — PATIENT MESSAGE (OUTPATIENT)
Dept: INTERNAL MEDICINE | Facility: CLINIC | Age: 73
End: 2018-06-03

## 2018-06-03 DIAGNOSIS — M25.569 KNEE PAIN, UNSPECIFIED CHRONICITY, UNSPECIFIED LATERALITY: Primary | ICD-10-CM

## 2018-06-04 ENCOUNTER — PATIENT MESSAGE (OUTPATIENT)
Dept: INTERNAL MEDICINE | Facility: CLINIC | Age: 73
End: 2018-06-04

## 2018-06-05 ENCOUNTER — PATIENT MESSAGE (OUTPATIENT)
Dept: INTERNAL MEDICINE | Facility: CLINIC | Age: 73
End: 2018-06-05

## 2018-06-06 ENCOUNTER — OFFICE VISIT (OUTPATIENT)
Dept: INTERNAL MEDICINE | Facility: CLINIC | Age: 73
End: 2018-06-06
Payer: COMMERCIAL

## 2018-06-06 ENCOUNTER — HOSPITAL ENCOUNTER (OUTPATIENT)
Dept: RADIOLOGY | Facility: HOSPITAL | Age: 73
Discharge: HOME | End: 2018-06-06
Attending: INTERNAL MEDICINE
Payer: COMMERCIAL

## 2018-06-06 VITALS
HEIGHT: 67 IN | WEIGHT: 135.38 LBS | OXYGEN SATURATION: 99 % | DIASTOLIC BLOOD PRESSURE: 80 MMHG | HEART RATE: 75 BPM | BODY MASS INDEX: 21.25 KG/M2 | SYSTOLIC BLOOD PRESSURE: 118 MMHG

## 2018-06-06 DIAGNOSIS — M76.51 PATELLAR TENDONITIS OF RIGHT KNEE: ICD-10-CM

## 2018-06-06 DIAGNOSIS — M76.51 PATELLAR TENDONITIS OF RIGHT KNEE: Primary | ICD-10-CM

## 2018-06-06 PROCEDURE — 99213 OFFICE O/P EST LOW 20 MIN: CPT | Mod: S$GLB,,, | Performed by: INTERNAL MEDICINE

## 2018-06-06 PROCEDURE — 73564 X-RAY EXAM KNEE 4 OR MORE: CPT | Mod: TC,RT

## 2018-06-06 PROCEDURE — 99999 PR PBB SHADOW E&M-EST. PATIENT-LVL III: CPT | Mod: PBBFAC,,, | Performed by: INTERNAL MEDICINE

## 2018-06-06 PROCEDURE — 73564 X-RAY EXAM KNEE 4 OR MORE: CPT | Mod: 26,RT,, | Performed by: RADIOLOGY

## 2018-06-06 NOTE — PATIENT INSTRUCTIONS
Common Kneecap (Patella) Problems  If the kneecap is off track even slightly (a tracking problem), it can cause uneven pressure on the back of the kneecap. This can cause pain and difficulty with movements, such as walking and going down stairs. Below are some common causes of kneecap pain.    Cartilage damage  Sometimes the cartilage on the back of the kneecap or in the groove of the thighbone is damaged. Damaged cartilage cant spread pressure evenly. Uneven pressure wears down the cartilage even further.   Dislocation  Sometimes a muscle or ligament in the knee is pulled the wrong way. Or the kneecap may be pushed too hard. Then the kneecap may move partly out of the groove (subluxation). It may even move completely out (dislocation).     Patellar tendinitis  Patellar tendinitis (jumpers knee) happens when the quadriceps muscles are overused or tight. During movement, the patellar tendon absorbs more shock than usual. The tendon becomes irritated or damaged.   Plica syndrome  Plica bands are tissue fibers that some people have near the kneecap. They usually cause no problems. But sometimes they can become irritated or inflamed.   Date Last Reviewed: 10/15/2015  © 1475-6077 [employer]. [address] [place] [address], [place] [address]. All rights reserved. This information is not intended as a substitute for professional medical care. Always follow your healthcare professional's instructions.

## 2018-06-06 NOTE — PROGRESS NOTES
Subjective:       Patient ID: La Salgado is a 73 y.o. female.    Chief Complaint: Annual Exam (injured right knee while picking up package incorrectly )    R knee pains, even sitting and turning wrong way is painful. Acutely came on 2 weeks ago with packing office up. Has bending down to do the packing. This was same knee that she had surgery on (not sure name of surgery) over 60 yrs ago. No f/cns, some mild swelling and warmth. Able to wt bear and walk, but diff getting up and walking up stairs.      Review of Systems   Constitutional: Positive for activity change.   Musculoskeletal: Positive for arthralgias. Negative for back pain, gait problem and myalgias.       Objective:      Physical Exam   Constitutional: She appears well-developed and well-nourished. No distress.   Musculoskeletal:   R knee mildly warm, mild swelling.  Normal knee rom with some patellar based crepitus.  Painful lower leg extension with pain localized to patellar bursa/tendon.  Neg jamal testing.  Normal knee strength/sensation.  No jlt   Skin: She is not diaphoretic.       Assessment:       1. Patellar tendonitis of right knee        Plan:       La was seen today for annual exam.    Diagnoses and all orders for this visit:    Patellar tendonitis of right knee  -     X-Ray Knee Complete 4 Or More Views Right; Future  Ice, rest, nsaids.  Explained that if not better in 1-2 weeks, pt should rtc/call PCP then see ortho

## 2018-08-06 ENCOUNTER — OFFICE VISIT (OUTPATIENT)
Dept: DERMATOLOGY | Facility: CLINIC | Age: 73
End: 2018-08-06
Payer: COMMERCIAL

## 2018-08-06 VITALS — WEIGHT: 135 LBS | BODY MASS INDEX: 21.14 KG/M2

## 2018-08-06 DIAGNOSIS — L81.4 LENTIGINES: ICD-10-CM
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
## 2018-08-06 DIAGNOSIS — L72.0 MILIUM CYST: ICD-10-CM

## 2018-08-06 PROCEDURE — 99213 OFFICE O/P EST LOW 20 MIN: CPT | Mod: S$GLB,,, | Performed by: DERMATOLOGY

## 2018-08-06 PROCEDURE — 99999 PR PBB SHADOW E&M-EST. PATIENT-LVL III: CPT | Mod: PBBFAC,,, | Performed by: DERMATOLOGY

## 2018-08-06 RX ORDER — MOMETASONE FUROATE 1 MG/G
CREAM TOPICAL DAILY
Qty: 50 G | Refills: 3 | Status: SHIPPED | OUTPATIENT
Start: 2018-08-06 | End: 2019-04-02

## 2018-08-06 NOTE — PROGRESS NOTES
Subjective:       Patient ID:  La Salgado is a 73 y.o. female who presents for   Chief Complaint   Patient presents with    Lesion     bilateral legs     The lesion on her post left lower leg (irritated sk) has resolved.  Now with lesions for over 2 weeks started after trip to [place] was in woods developed urticarial papules on legs which took over a week to resolve now much better used benadryl for the pruritus.         Review of Systems   Constitutional: Negative for fever.   Skin: Positive for rash. Negative for itching.   Hematologic/Lymphatic: Does not bruise/bleed easily.        Objective:    Physical Exam   Constitutional: She appears well-developed and well-nourished. No distress.   Neurological: She is alert and oriented to person, place, and time. She is not disoriented.   Psychiatric: She has a normal mood and affect.   Skin:   Areas Examined (abnormalities noted in diagram):   Head / Face Inspection Performed  Neck Inspection Performed  RUE Inspected  LUE Inspection Performed  RLE Inspected  LLE Inspection Performed                   Diagram Legend     Erythematous scaling macule/papule c/w actinic keratosis       Vascular papule c/w angioma      Pigmented verrucoid papule/plaque c/w seborrheic keratosis      Yellow umbilicated papule c/w sebaceous hyperplasia      Irregularly shaped tan macule c/w lentigo     1-2 mm smooth white papules consistent with Milia      Movable subcutaneous cyst with punctum c/w epidermal inclusion cyst      Subcutaneous movable cyst c/w pilar cyst      Firm pink to brown papule c/w dermatofibroma      Pedunculated fleshy papule(s) c/w skin tag(s)      Evenly pigmented macule c/w junctional nevus     Mildly variegated pigmented, slightly irregular-bordered macule c/w mildly atypical nevus      Flesh colored to evenly pigmented papule c/w intradermal nevus       Pink pearly papule/plaque c/w basal cell carcinoma      Erythematous hyperkeratotic cursted plaque c/w SCC       "Surgical scar with no sign of skin cancer recurrence      Open and closed comedones      Inflammatory papules and pustules      Verrucoid papule consistent consistent with wart     Erythematous eczematous patches and plaques     Dystrophic onycholytic nail with subungual debris c/w onychomycosis     Umbilicated papule    Erythematous-base heme-crusted tan verrucoid plaque consistent with inflamed seborrheic keratosis     Erythematous Silvery Scaling Plaque c/w Psoriasis     See annotation      Assessment / Plan:        Insect bite, initial encounter  -     mometasone 0.1% (ELOCON) 0.1 % cream; Apply topically once daily. for 10 days  Dispense: 50 g; Refill: 3    Milium cyst  reassurance      Lentigines  The "ABCD" rules to observe pigmented lesions were reviewed.               Follow-up in about 1 year (around 8/6/2019).  "

## 2018-11-26 ENCOUNTER — PATIENT MESSAGE (OUTPATIENT)
Dept: INTERNAL MEDICINE | Facility: CLINIC | Age: 73
End: 2018-11-26

## 2018-11-29 ENCOUNTER — PATIENT MESSAGE (OUTPATIENT)
Dept: INTERNAL MEDICINE | Facility: CLINIC | Age: 73
End: 2018-11-29

## 2018-11-30 ENCOUNTER — OFFICE VISIT (OUTPATIENT)
Dept: INTERNAL MEDICINE | Facility: CLINIC | Age: 73
End: 2018-11-30
Payer: COMMERCIAL

## 2018-11-30 ENCOUNTER — PATIENT MESSAGE (OUTPATIENT)
Dept: INTERNAL MEDICINE | Facility: CLINIC | Age: 73
End: 2018-11-30

## 2018-11-30 ENCOUNTER — HOSPITAL ENCOUNTER (OUTPATIENT)
Dept: RADIOLOGY | Facility: HOSPITAL | Age: 73
Discharge: HOME | End: 2018-11-30
Attending: INTERNAL MEDICINE
Payer: COMMERCIAL

## 2018-11-30 VITALS
HEIGHT: 67 IN | OXYGEN SATURATION: 99 % | DIASTOLIC BLOOD PRESSURE: 62 MMHG | WEIGHT: 134.69 LBS | BODY MASS INDEX: 21.14 KG/M2 | SYSTOLIC BLOOD PRESSURE: 104 MMHG | HEART RATE: 84 BPM

## 2018-11-30 DIAGNOSIS — S20.211A CONTUSION OF RIGHT CHEST WALL, INITIAL ENCOUNTER: Primary | ICD-10-CM

## 2018-11-30 DIAGNOSIS — S20.211A CONTUSION OF RIGHT CHEST WALL, INITIAL ENCOUNTER: ICD-10-CM

## 2018-11-30 PROCEDURE — 99999 PR PBB SHADOW E&M-EST. PATIENT-LVL III: CPT | Mod: PBBFAC,,, | Performed by: INTERNAL MEDICINE

## 2018-11-30 PROCEDURE — 99214 OFFICE O/P EST MOD 30 MIN: CPT | Mod: S$GLB,,, | Performed by: INTERNAL MEDICINE

## 2018-11-30 PROCEDURE — 1101F PT FALLS ASSESS-DOCD LE1/YR: CPT | Mod: CPTII,S$GLB,, | Performed by: INTERNAL MEDICINE

## 2018-11-30 PROCEDURE — 71046 X-RAY EXAM CHEST 2 VIEWS: CPT | Mod: TC

## 2018-11-30 PROCEDURE — 71046 X-RAY EXAM CHEST 2 VIEWS: CPT | Mod: 26,,, | Performed by: RADIOLOGY

## 2018-11-30 NOTE — PATIENT INSTRUCTIONS
Tylenol or acetaminophen 500-1000mg every 6-8 hours as needed for pain/fevers.    Also you can take ibuprofen or advil 200-400mg every 6-8 hours as needed for pain/fevers.

## 2018-11-30 NOTE — TELEPHONE ENCOUNTER
Hi, please call her and see if she can come in for 12pm today as an add on.  Thank you, Mason Flowers

## 2018-11-30 NOTE — PROGRESS NOTES
Subjective:       Patient ID: La Salgado is a 73 y.o. female.    Chief Complaint: Pain (for thanksgiving patient had something stuck in her throat when her nephew went to put his hands around her ribcage area to help with relieve chocking and the next day her right side rib bone started to hurt, also hurts when taking deep breaths )    Here for urgent care --  1 week ago had partial choking on meat, nephew performed heimlich, 1 day later c/b R sided ant rib pain, pleuritic pain, which progressively worsened over last 6 days. Today is for first time btr than yest.    GI bug from 4 days ago now completely resolved.    No current f/c/ns, no cough, does feels ob when taking a deep breath. Bowels are OK. No current n/v, appetite is OK as well.      Review of Systems   Constitutional: Negative for activity change, diaphoresis and fatigue.   HENT: Negative for trouble swallowing.    Respiratory: Negative for cough.    Cardiovascular: Positive for chest pain. Negative for palpitations and leg swelling.   Gastrointestinal: Negative for abdominal distention, abdominal pain, nausea and vomiting.   Neurological: Negative for tremors, syncope and weakness.       Objective:      Physical Exam   Constitutional: She is oriented to person, place, and time. She appears well-developed and well-nourished. No distress.   Not toxic appearing.   HENT:   Head: Normocephalic and atraumatic.   Eyes: EOM are normal.   No scleral pallor     Neck: Normal range of motion. Neck supple. No thyromegaly present.   Cardiovascular: Normal rate, regular rhythm and normal heart sounds.   Pulmonary/Chest: Effort normal and breath sounds normal. No stridor. No respiratory distress. She has no wheezes. She has no rales. She exhibits tenderness (along lower R anterior rib cage, not focal).   Abdominal: Soft. Bowel sounds are normal. She exhibits no distension and no mass. There is no tenderness. There is no rebound and no guarding. No hernia.    Musculoskeletal: She exhibits no edema or tenderness.   Lymphadenopathy:     She has no cervical adenopathy.   Neurological: She is alert and oriented to person, place, and time.   Skin: She is not diaphoretic.   Psychiatric: She has a normal mood and affect. Her behavior is normal.       Assessment:       1. Contusion of right chest wall, initial encounter        Plan:       La was seen today for pain.    Diagnoses and all orders for this visit:    Contusion of right chest wall, initial encounter  -     X-Ray Chest PA And Lateral; Future  Most likely the dyspnea is just from pleuritis pain from chest wall contusion from Heimlich, will check CXR to ensure no effusion/other abnormality.  Pain improving is reassuring.  Patient Instructions   Tylenol or acetaminophen 500-1000mg every 6-8 hours as needed for pain/fevers.    Also you can take ibuprofen or advil 200-400mg every 6-8 hours as needed for pain/fevers.      Explained that if not better in 1-2 weeks, pt should rtc/call PCP          Health Maintenance       Date Due Completion Date    DEXA SCAN 02/09/2020 2/9/2017    Mammogram 03/16/2020 3/16/2018    Colonoscopy 08/19/2021 8/19/2016    Lipid Panel 05/25/2023 5/25/2018    TETANUS VACCINE 01/28/2025 1/28/2015          No Follow-up on file.    Future Appointments   Date Time Provider Department Center   11/30/2018  2:00 PM [id number] 485 LB LIMIT [facility]

## 2018-12-02 ENCOUNTER — PATIENT MESSAGE (OUTPATIENT)
Dept: INTERNAL MEDICINE | Facility: CLINIC | Age: 73
End: 2018-12-02

## 2018-12-02 DIAGNOSIS — R91.1 LUNG NODULE: ICD-10-CM

## 2018-12-07 ENCOUNTER — HOSPITAL ENCOUNTER (OUTPATIENT)
Dept: RADIOLOGY | Facility: HOSPITAL | Age: 73
Discharge: HOME | End: 2018-12-07
Attending: INTERNAL MEDICINE
Payer: COMMERCIAL

## 2018-12-07 DIAGNOSIS — R91.1 LUNG NODULE: ICD-10-CM

## 2018-12-07 PROCEDURE — 71250 CT THORAX DX C-: CPT | Mod: TC

## 2018-12-07 PROCEDURE — 71250 CT THORAX DX C-: CPT | Mod: 26,,, | Performed by: RADIOLOGY

## 2019-03-31 ENCOUNTER — PATIENT MESSAGE (OUTPATIENT)
Dept: INTERNAL MEDICINE | Facility: CLINIC | Age: 74
End: 2019-03-31

## 2019-04-01 NOTE — TELEPHONE ENCOUNTER
Patient does not have any labs but unsure what you are going to ask of her when she gets here so she wants to know if she has to fast?

## 2019-04-02 ENCOUNTER — HOSPITAL ENCOUNTER (OUTPATIENT)
Dept: RADIOLOGY | Facility: HOSPITAL | Age: 74
Discharge: HOME | End: 2019-04-02
Attending: INTERNAL MEDICINE
Payer: COMMERCIAL

## 2019-04-02 ENCOUNTER — OFFICE VISIT (OUTPATIENT)
Dept: INTERNAL MEDICINE | Facility: CLINIC | Age: 74
End: 2019-04-02
Payer: COMMERCIAL

## 2019-04-02 VITALS
SYSTOLIC BLOOD PRESSURE: 106 MMHG | DIASTOLIC BLOOD PRESSURE: 60 MMHG | OXYGEN SATURATION: 98 % | HEART RATE: 68 BPM | BODY MASS INDEX: 21.63 KG/M2 | HEIGHT: 67 IN | WEIGHT: 137.81 LBS

## 2019-04-02 DIAGNOSIS — Z12.31 ENCOUNTER FOR SCREENING MAMMOGRAM FOR MALIGNANT NEOPLASM OF BREAST: ICD-10-CM

## 2019-04-02 DIAGNOSIS — Z00.00 ROUTINE GENERAL MEDICAL EXAMINATION AT A HEALTH CARE FACILITY: Primary | ICD-10-CM

## 2019-04-02 DIAGNOSIS — E78.5 HYPERLIPIDEMIA, UNSPECIFIED HYPERLIPIDEMIA TYPE: ICD-10-CM

## 2019-04-02 DIAGNOSIS — H57.9 VISUAL COMPLAINT: ICD-10-CM

## 2019-04-02 PROCEDURE — 77067 SCR MAMMO BI INCL CAD: CPT | Mod: TC

## 2019-04-02 PROCEDURE — 99397 PER PM REEVAL EST PAT 65+ YR: CPT | Mod: S$GLB,,, | Performed by: INTERNAL MEDICINE

## 2019-04-02 PROCEDURE — 77067 SCR MAMMO BI INCL CAD: CPT | Mod: 26,,, | Performed by: RADIOLOGY

## 2019-04-02 PROCEDURE — 99999 PR PBB SHADOW E&M-EST. PATIENT-LVL IV: CPT | Mod: PBBFAC,,, | Performed by: INTERNAL MEDICINE

## 2019-04-02 PROCEDURE — 77067 MAMMO DIGITAL SCREENING BILAT WITH CAD: ICD-10-PCS | Mod: 26,,, | Performed by: RADIOLOGY

## 2019-04-02 PROCEDURE — 99999 PR PBB SHADOW E&M-EST. PATIENT-LVL IV: ICD-10-PCS | Mod: PBBFAC,,, | Performed by: INTERNAL MEDICINE

## 2019-04-02 PROCEDURE — 99397 PR PREVENTIVE VISIT,EST,65 & OVER: ICD-10-PCS | Mod: S$GLB,,, | Performed by: INTERNAL MEDICINE

## 2019-04-02 RX ORDER — ATORVASTATIN CALCIUM 10 MG/1
10 TABLET, FILM COATED ORAL DAILY
Qty: 90 TABLET | Refills: 3 | Status: SHIPPED | OUTPATIENT
Start: 2019-04-02 | End: 2020-04-13

## 2019-04-02 NOTE — PROGRESS NOTES
Subjective:       Patient ID: La Salgado is a 74 y.o. female.    Chief Complaint: Annual Exam    Patient is here for followup for chronic conditions.    Still hearing loss.    2 yrs ago dxed with osteopenia/borderline osteoporosis. No fam hx of OP/hip fxs.    Taking statin with good adh and no change in mild occ muscle cramps.    Review of Systems   Constitutional: Negative for activity change and unexpected weight change.   HENT: Positive for hearing loss. Negative for rhinorrhea and trouble swallowing.    Eyes: Positive for visual disturbance. Negative for discharge.   Respiratory: Negative for chest tightness and wheezing.    Cardiovascular: Negative for chest pain and palpitations.   Gastrointestinal: Negative for blood in stool, constipation, diarrhea and vomiting.   Endocrine: Negative for polydipsia and polyuria.   Genitourinary: Negative for difficulty urinating, dysuria, hematuria and menstrual problem.   Musculoskeletal: Positive for arthralgias (knees). Negative for joint swelling and neck pain.   Neurological: Negative for weakness and headaches.   Psychiatric/Behavioral: Negative for confusion and dysphoric mood.       Objective:      Physical Exam   Constitutional: She is oriented to person, place, and time. She appears well-developed and well-nourished. No distress.   HENT:   Head: Normocephalic and atraumatic.   TMs clear and no cerumen in ext canals   Eyes: Pupils are equal, round, and reactive to light. No scleral icterus.   Neck: Normal range of motion. No thyromegaly present.   Cardiovascular: Normal rate, regular rhythm and normal heart sounds. Exam reveals no gallop and no friction rub.   No murmur heard.  Pulmonary/Chest: Effort normal and breath sounds normal. No respiratory distress. She has no wheezes. She has no rales.   Abdominal: Soft. Bowel sounds are normal. She exhibits no distension and no mass. There is no tenderness. There is no rebound and no guarding.   Musculoskeletal:  Normal range of motion. She exhibits no edema or tenderness.   R knee crepitus but good rom, not warm or swollen.     Lymphadenopathy:     She has no cervical adenopathy.   Neurological: She is alert and oriented to person, place, and time. She displays normal reflexes. No cranial nerve deficit. She exhibits normal muscle tone. Coordination normal.   Skin: She is not diaphoretic.   Psychiatric: She has a normal mood and affect. Her speech is normal and behavior is normal. Cognition and memory are normal.       Assessment:       1. Visual complaint    2. Hyperlipidemia, unspecified hyperlipidemia type    3. Encounter for screening mammogram for malignant neoplasm of breast        Plan:       La was seen today for annual exam.    Diagnoses and all orders for this visit:    Visual complaint  -     Ambulatory Referral to Optometry    Hyperlipidemia, unspecified hyperlipidemia type  -     CBC auto differential; Future  -     Comprehensive metabolic panel; Future  -     Lipid panel; Future  -     atorvastatin (LIPITOR) 10 MG tablet; Take 1 tablet (10 mg total) by mouth once daily.    Encounter for screening mammogram for malignant neoplasm of breast  -     Cancel: Mammo Digital Screening Bilat without CA; Future        Health Maintenance       Date Due Completion Date    DEXA SCAN 02/09/2020 2/9/2017    Mammogram 03/16/2020 3/16/2018    Colonoscopy 08/19/2021 8/19/2016    Lipid Panel 05/25/2023 5/25/2018    TETANUS VACCINE 01/28/2025 1/28/2015      bmd repeat 2 years, she would not want to try bisphosphonates if needed    Follow up in about 1 year (around 4/2/2020).    Future Appointments   Date Time Provider Department Center   4/23/2019  2:00 PM Antoine Ayala OD [facility]

## 2019-04-23 ENCOUNTER — OFFICE VISIT (OUTPATIENT)
Dept: OPTOMETRY | Facility: CLINIC | Age: 74
End: 2019-04-23
Payer: COMMERCIAL

## 2019-04-23 DIAGNOSIS — H52.13 MYOPIA WITH ASTIGMATISM AND PRESBYOPIA, BILATERAL: ICD-10-CM

## 2019-04-23 DIAGNOSIS — H52.203 MYOPIA WITH ASTIGMATISM AND PRESBYOPIA, BILATERAL: ICD-10-CM

## 2019-04-23 DIAGNOSIS — H04.123 DRY EYE SYNDROME OF BOTH EYES: Primary | ICD-10-CM

## 2019-04-23 DIAGNOSIS — H52.4 MYOPIA WITH ASTIGMATISM AND PRESBYOPIA, BILATERAL: ICD-10-CM

## 2019-04-23 DIAGNOSIS — H25.13 NUCLEAR SCLEROSIS OF BOTH EYES: ICD-10-CM

## 2019-04-23 PROCEDURE — 92004 COMPRE OPH EXAM NEW PT 1/>: CPT | Mod: S$GLB,,, | Performed by: OPTOMETRIST

## 2019-04-23 PROCEDURE — 99999 PR PBB SHADOW E&M-EST. PATIENT-LVL III: ICD-10-PCS | Mod: PBBFAC,,, | Performed by: OPTOMETRIST

## 2019-04-23 PROCEDURE — 92004 PR EYE EXAM, NEW PATIENT,COMPREHESV: ICD-10-PCS | Mod: S$GLB,,, | Performed by: OPTOMETRIST

## 2019-04-23 PROCEDURE — 99999 PR PBB SHADOW E&M-EST. PATIENT-LVL III: CPT | Mod: PBBFAC,,, | Performed by: OPTOMETRIST

## 2019-04-23 PROCEDURE — 92015 DETERMINE REFRACTIVE STATE: CPT | Mod: S$GLB,,, | Performed by: OPTOMETRIST

## 2019-04-23 PROCEDURE — 92015 PR REFRACTION: ICD-10-PCS | Mod: S$GLB,,, | Performed by: OPTOMETRIST

## 2019-04-23 NOTE — PROGRESS NOTES
HPI     JOSESITO: 2 yrs   Glasses? No  Contacts? No  H/o eye surgery: No  H/o eye injury: No  Known eye conditions? 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 When asked what color she stated that they were just   'lights'.    Last edited by Radha Larose on 4/23/2019  3:44 PM. (History)        ROS     Negative for: Constitutional, Gastrointestinal, Neurological, Skin,   Genitourinary, Musculoskeletal, HENT, Endocrine, Cardiovascular, Eyes,   Respiratory, Psychiatric, Allergic/Imm, Heme/Lymph    Last edited by Antoine Ayala, OD on 4/23/2019  4:15 PM. (History)        Assessment /Plan     For exam results, see Encounter Report.    Dry eye syndrome of both eyes    Nuclear sclerosis of both eyes    Myopia with astigmatism and presbyopia, bilateral    1. Recommend Systane Ultra or Refresh Optive BID-TID OU to aid with symptoms of dry eyes.  2. Nuclear sclerotic cataract - not visually significant. Observe.  3. SRx released to patient. Patient educated on lens options. Normal ocular health. RTC 1 year for routine exam.

## 2019-04-23 NOTE — LETTER
April 23, 2019      Mason Flowers MD  [address]  [facility] [address]           [facility]  [address]  [facility] [address]  Phone: [phone]  Fax: [phone]          Patient: La Salgado   MR Number: [id number]   YOB: 1945   Date of Visit: 4/23/2019       Dear Dr. Mason Flowers:    Thank you for referring La Salgado to me for evaluation. Attached you will find relevant portions of my assessment and plan of care.    If you have questions, please do not hesitate to call me. I look forward to following La Salgado along with you.    Sincerely,    Antoine Ayala, OD    Enclosure  CC:  No Recipients    If you would like to receive this communication electronically, please contact [email or web address] or [phone] to request more information on [employer] access.    For providers and/or their staff who would like to refer a patient to [facility], please contact us through our one-stop-shop provider referral line, [facility], at [phone].    If you feel you have received this communication in error or would no longer like to receive these types of communications, please e-mail [email or web address]

## 2019-04-26 ENCOUNTER — PATIENT MESSAGE (OUTPATIENT)
Dept: OPTOMETRY | Facility: CLINIC | Age: 74
End: 2019-04-26

## 2019-05-21 ENCOUNTER — PATIENT MESSAGE (OUTPATIENT)
Dept: INTERNAL MEDICINE | Facility: CLINIC | Age: 74
End: 2019-05-21

## 2019-05-22 ENCOUNTER — PATIENT MESSAGE (OUTPATIENT)
Dept: INTERNAL MEDICINE | Facility: CLINIC | Age: 74
End: 2019-05-22

## 2019-05-23 ENCOUNTER — OFFICE VISIT (OUTPATIENT)
Dept: PRIMARY CARE CLINIC | Facility: CLINIC | Age: 74
End: 2019-05-23
Payer: COMMERCIAL

## 2019-05-23 ENCOUNTER — PATIENT MESSAGE (OUTPATIENT)
Dept: INTERNAL MEDICINE | Facility: CLINIC | Age: 74
End: 2019-05-23

## 2019-05-23 VITALS
HEART RATE: 71 BPM | SYSTOLIC BLOOD PRESSURE: 120 MMHG | BODY MASS INDEX: 21.48 KG/M2 | WEIGHT: 136.88 LBS | TEMPERATURE: 99 F | OXYGEN SATURATION: 97 % | DIASTOLIC BLOOD PRESSURE: 74 MMHG | HEIGHT: 67 IN

## 2019-05-23 DIAGNOSIS — R09.82 POSTNASAL DRIP: ICD-10-CM

## 2019-05-23 DIAGNOSIS — J20.9 ACUTE BRONCHITIS, UNSPECIFIED ORGANISM: Primary | ICD-10-CM

## 2019-05-23 PROCEDURE — 99999 PR PBB SHADOW E&M-EST. PATIENT-LVL III: CPT | Mod: PBBFAC,,, | Performed by: INTERNAL MEDICINE

## 2019-05-23 PROCEDURE — 99214 OFFICE O/P EST MOD 30 MIN: CPT | Mod: S$GLB,,, | Performed by: INTERNAL MEDICINE

## 2019-05-23 PROCEDURE — 99214 PR OFFICE/OUTPT VISIT, EST, LEVL IV, 30-39 MIN: ICD-10-PCS | Mod: S$GLB,,, | Performed by: INTERNAL MEDICINE

## 2019-05-23 PROCEDURE — 1101F PR PT FALLS ASSESS DOC 0-1 FALLS W/OUT INJ PAST YR: ICD-10-PCS | Mod: CPTII,S$GLB,, | Performed by: INTERNAL MEDICINE

## 2019-05-23 PROCEDURE — 1101F PT FALLS ASSESS-DOCD LE1/YR: CPT | Mod: CPTII,S$GLB,, | Performed by: INTERNAL MEDICINE

## 2019-05-23 PROCEDURE — 99999 PR PBB SHADOW E&M-EST. PATIENT-LVL III: ICD-10-PCS | Mod: PBBFAC,,, | Performed by: INTERNAL MEDICINE

## 2019-05-23 NOTE — PROGRESS NOTES
Subjective:        Patient ID: La Salgado is a 74 y.o. female.    Chief Complaint: Sinus Problem and Cough    HPI   La Salgado presents with c/o cough and postnasal drip x 1 week.  She also reports generalized malaise, occasional lightheadedness.  She denies fever, chills, rhinorrhea (every goes back down her throat), purulent sputum.  She can feel some mucous in her throat/chest but hasn't been coughing anything up.  She took an OTC medication containing zinc but doesn't think it does anything.    She reports a hx of bronchitis and PNA.    Review of Systems  as per HPI      Objective:        Vitals:    05/23/19 0854   BP: 120/74   Pulse: 71   Temp: 98.7 °F (37.1 °C)     Physical Exam   Constitutional: She is oriented to person, place, and time. She appears well-developed and well-nourished. No distress.   HENT:   Head: Normocephalic and atraumatic.   Right Ear: External ear normal.   Left Ear: External ear normal.   Nose: Nose normal.   Mouth/Throat: Oropharynx is clear and moist. No oropharyngeal exudate.   Eyes: Conjunctivae and EOM are normal.   Neck: Neck supple.   Cardiovascular: Normal rate and regular rhythm.   Pulmonary/Chest: Effort normal and breath sounds normal. No stridor. No respiratory distress. She has no wheezes. She has no rales.   - good air movement in all lung fields  - no bronchial breath sounds or ronchi   Lymphadenopathy:     She has no cervical adenopathy.   Neurological: She is alert and oriented to person, place, and time.   Skin: Skin is warm and dry. She is not diaphoretic.   Vitals reviewed.          Assessment:         1. Acute bronchitis, unspecified organism    2. Postnasal drip              Plan:         La was seen today for sinus problem and cough.    Diagnoses and all orders for this visit:    Acute bronchitis, unspecified organism    Postnasal drip    - Counseled patient this is likely a viral infection; recommend treating symptoms and allowing 7-10 days for  improvement.  - pt reports she's very sensitive to medications: take 1/2 claritin or zyrtec in the evening  - nasal saline rinse  - OTC guaifenesin to loosen mucous  - f/u if sx get worse or do not improve in a week            Patient Instructions   FOR FEVER, ACHES AND PAIN:    Acetaminophen (Tylenol): Maximum 1 gram (1000mg) per dose, up to 3 times per day.  Maximum 3000mg in 24 hours    Ibuprofen (Motrin, Advil): Maximum 800mg per dose, up to 3 times per day.  Maximum 2400mg in 24 hours.  Take with food.  If you take this daily for more than 1 week, start taking over the counter Omeprazole (Prilosec) or Esomeprazole (Nexium) daily to protect your stomach.    Naproxen (Aleve): Maximum 500mg per dose, up to 2 times per day.  Maximum 2 doses in 24 hours.  Take with food.  If you take this daily for more than 1 week, start taking over the counter Omeprazole (Prilosec) or Esomeprazole (Nexium) daily to protect your stomach.    FOR ALLERGIES AND NASAL SINUS CONGESTION:  (sneezing, itching, runny nose, stuff nose)    Take an oral antihistamine (Claritin, Zyrtec, Allegra, Benadryl) daily (in the evening if it makes you sleepy).    Use a nasal steroid spray (Flonase, Nasonex, Nasacort, Rhinocort) twice daily until congestion gets better, then decrease to as needed.    Take over the counter decongestants like phenylephrine and pseudoephedrine.  **Do not take these if you have high blood pressure**    FOR CHEST CONGESTION AND MUCOUS:    Take over the counter Guaifenesin - this will loosen and break up thick mucous.    Vicks Vapor Rub    FOR COUGH:    Over the counter Dextromethorphan    Cough drops    Warm liquids with honey    FOR THROAT PAIN:    Over the counter Chloraseptic spray, Cepacol lozenges    Warm liquids

## 2019-06-26 ENCOUNTER — PATIENT MESSAGE (OUTPATIENT)
Dept: INTERNAL MEDICINE | Facility: CLINIC | Age: 74
End: 2019-06-26

## 2019-06-26 DIAGNOSIS — Z46.1 HEARING AID FITTING OR ADJUSTMENT: ICD-10-CM

## 2019-06-26 DIAGNOSIS — H91.90 HEARING LOSS, UNSPECIFIED HEARING LOSS TYPE, UNSPECIFIED LATERALITY: Primary | ICD-10-CM

## 2019-06-27 NOTE — TELEPHONE ENCOUNTER
Hi, please contact the patient to assist in scheduling    Orders Placed This Encounter    Ambulatory Referral to Audiology       Thank you, Mason Flowers

## 2019-07-01 NOTE — TELEPHONE ENCOUNTER
No answer - left message on voicemail with # for central scheduling for her to call and schedule with Audiology.

## 2019-07-29 ENCOUNTER — PATIENT MESSAGE (OUTPATIENT)
Dept: INTERNAL MEDICINE | Facility: CLINIC | Age: 74
End: 2019-07-29

## 2019-08-01 ENCOUNTER — OFFICE VISIT (OUTPATIENT)
Dept: INTERNAL MEDICINE | Facility: CLINIC | Age: 74
End: 2019-08-01
Payer: COMMERCIAL

## 2019-08-01 VITALS
HEIGHT: 67 IN | BODY MASS INDEX: 22.15 KG/M2 | WEIGHT: 141.13 LBS | DIASTOLIC BLOOD PRESSURE: 70 MMHG | SYSTOLIC BLOOD PRESSURE: 105 MMHG

## 2019-08-01 DIAGNOSIS — M54.50 ACUTE MIDLINE LOW BACK PAIN WITHOUT SCIATICA: Primary | ICD-10-CM

## 2019-08-01 PROCEDURE — 1101F PT FALLS ASSESS-DOCD LE1/YR: CPT | Mod: CPTII,S$GLB,, | Performed by: STUDENT IN AN ORGANIZED HEALTH CARE EDUCATION/TRAINING PROGRAM

## 2019-08-01 PROCEDURE — 1101F PR PT FALLS ASSESS DOC 0-1 FALLS W/OUT INJ PAST YR: ICD-10-PCS | Mod: CPTII,S$GLB,, | Performed by: STUDENT IN AN ORGANIZED HEALTH CARE EDUCATION/TRAINING PROGRAM

## 2019-08-01 PROCEDURE — 99213 OFFICE O/P EST LOW 20 MIN: CPT | Mod: S$GLB,,, | Performed by: STUDENT IN AN ORGANIZED HEALTH CARE EDUCATION/TRAINING PROGRAM

## 2019-08-01 PROCEDURE — 99213 PR OFFICE/OUTPT VISIT, EST, LEVL III, 20-29 MIN: ICD-10-PCS | Mod: S$GLB,,, | Performed by: STUDENT IN AN ORGANIZED HEALTH CARE EDUCATION/TRAINING PROGRAM

## 2019-08-01 PROCEDURE — 99999 PR PBB SHADOW E&M-EST. PATIENT-LVL III: ICD-10-PCS | Mod: PBBFAC,,, | Performed by: STUDENT IN AN ORGANIZED HEALTH CARE EDUCATION/TRAINING PROGRAM

## 2019-08-01 PROCEDURE — 99999 PR PBB SHADOW E&M-EST. PATIENT-LVL III: CPT | Mod: PBBFAC,,, | Performed by: STUDENT IN AN ORGANIZED HEALTH CARE EDUCATION/TRAINING PROGRAM

## 2019-08-01 NOTE — PATIENT INSTRUCTIONS
Back Exercises: Leg Reach        Do this exercise on your hands and knees. Keep your knees under your hips and your hands under your shoulders. Keep your spine in a neutral position (not arched or sagging). Be sure to maintain your necks natural curve.  · Extend one leg straight back. Dont arch your back or let your head or body sag.  · Hold for 5 seconds. Return to starting position.  · Repeat 5 times.  · Switch legs.   © 5384-9131 [employer]. [address] [place] [address]. All rights reserved. This information is not intended as a substitute for professional medical care. Always follow your healthcare professional's instructions.        Back Exercises: Lower Back Stretch                        To start, sit in a chair with your feet flat on the floor. Shift your weight slightly forward to avoid rounding your back. Relax, and keep your ears, shoulders, and hips aligned.  · Sit with your feet well apart.  · Bend forward and touch the floor with the backs of your hands. Relax and let your body drop.  · Hold for 20 seconds. Return to starting position.  · Repeat 2 times.   © 6260-6527 [employer]. [address] [place] [address]. All rights reserved. This information is not intended as a substitute for professional medical care. Always follow your healthcare professional's instructions.        Leg and Knee Exercises: Leg Raise    This exercise is designed to stretch and strengthen your knee. Before beginning, read through all the instructions. While exercising, breathe normally and use smooth movements. If you feel any pain, stop the exercise. If pain persists, call your health care provider.  CAUTION  · Dont arch your back.  · Dont hunch your shoulders.   · Sit on the floor with your _________ leg straight, the other bent.  · Tighten the thigh muscles on the top of your straight leg. You should feel the muscles contract. Raise that leg 6-8 inches. Then lower it  slowly and steadily to the floor. Relax.  · Repeat ______ times.  Do ______ sets a day.  © 1759-8722 [employer] [address] [place] [address], [place] [address]. All rights reserved. This information is not intended as a substitute for professional medical care. Always follow your healthcare professional's instructions.

## 2019-08-01 NOTE — PROGRESS NOTES
INTERNAL MEDICINE [facility]IDENT [facility]INIC  CLINIC NOTE    Patient Name: La Salgado  YOB: 1945    PRESENTING HISTORY       History of Present Illness:  Ms. La Salgado is a 74 y.o. female w/ significant PMHx of HLD presenting with back pain x 2-3 months. Pain is 2-3/10, dull aching, non radiating in nature only comes when she lies down. She localizes it to her tail-bone and denies any associated fevers, chills, weight loss, urinary incontinence. She, though, reports bowel incontinence x 3 episodes in last 3 months which is new to her.   She c/o pain in her knees b/l that has been present since the age of 20.     Denies cough, SOB, chest pain, n/v/d, recent illness, pain in legs/thighs, abdominal pain.    Review of Systems:  Constitutional: no fever or chills  Eyes: no visual changes  ENT: no nasal congestion or sore throat  Respiratory: no cough or shortness of breath  Cardiovascular: no chest pain or palpitations  Gastrointestinal: no nausea or vomiting, no abdominal pain or change in bowel habits, bowel incontinence x 3 episodes   Genitourinary: no hematuria or dysuria  Musculoskeletal: pain in her knees b/l+, back pain+  Neurological: no seizures or tremors  Skin: No rashes    PAST HISTORY:     Past Medical History:   Diagnosis Date    Anxiety     H/O adenomatous polyp of colon 2008    Osteoporosis 12/11/2013    Plantar wart 9/6/2013       Past Surgical History:   Procedure Laterality Date    BREAST BIOPSY Left     Excisional bx, benign    COLONOSCOPY  2011    COLONOSCOPY N/A 8/19/2016    Performed by [facility] ([facility])    ESOPHAGOGASTRODUODENOSCOPY (EGD) N/A 10/25/2017    Performed by Donovan Moy [facility] at [facility] ([facility])    HYSTERECTOMY      KNEE SURGERY Right     OOPHORECTOMY      PARTIAL HYSTERECTOMY         Family History   Problem Relation Age of Onset    Skin cancer Father     ALS Father     Heart disease Mother         arrhythmia     Dementia Mother     Seizures Mother     Breast cancer Paternal Aunt        Social History     Socioeconomic History    Marital status:      Spouse name: Not on file    Number of children: Not on file    Years of education: Not on file    Highest education level: Not on file   Occupational History     Employer: [employer]   Social Needs    Financial resource strain: Not hard at all    Food insecurity:     Worry: Never true     Inability: Never true    Transportation needs:     Medical: No     Non-medical: No   Tobacco Use    Smoking status: Never Smoker    Smokeless tobacco: Never Used   Substance and Sexual Activity    Alcohol use: No     Frequency: Monthly or less     Drinks per session: 1 or 2     Binge frequency: Never     Comment: rare    Drug use: Not on file    Sexual activity: Not on file   Lifestyle    Physical activity:     Days per week: 2 days     Minutes per session: 20 min    Stress: Only a little   Relationships    Social connections:     Talks on phone: Once a week     Gets together: Once a week     Attends Congregational service: Not on file     Active member of club or organization: No     Attends meetings of clubs or organizations: Never     Relationship status:    Other Topics Concern    Are you pregnant or think you may be? Not Asked    Breast-feeding Not Asked   Social History Narrative    , history at [employer] for NO studies.    Lives alone. No kids. sister in [place].    Walking for exercise.       MEDICATIONS & ALLERGIES:     Current Outpatient Medications on File Prior to Visit   Medication Sig    atorvastatin (LIPITOR) 10 MG tablet Take 1 tablet (10 mg total) by mouth once daily.     No current facility-administered medications on file prior to visit.        Review of patient's allergies indicates:   Allergen Reactions    Amoxicillin Other (See Comments)     Hand tingling. Streaking redness in hand    Codeine Other (See  "Comments)     nausea    Risedronate      Other reaction(s): triggers an auto immune attack  Other reaction(s): triggers an auto immune attack    Sulfa (sulfonamide antibiotics)      Nausea         OBJECTIVE:   Vital Signs:  Vitals:    08/01/19 1306   BP: 105/70   Weight: 64 kg (141 lb 1.5 oz)   Height: 5' 7" (1.702 m)       No results found for this or any previous visit (from the past 24 hour(s)).      Physical Exam:   General:  Well developed, well nourished, no acute distress  HEENT:  Normocephalic, atraumatic, PERRL, EOMI, clear sclera, throat clear without erythema or exudates  Neck: No carotid bruits. No thyromegaly.   CVS:  RRR, S1 and S2 normal, no murmurs, rubs, gallops  Resp:  Lungs clear to auscultation, no wheezes, rales, rhonchi, cough  GI:  Abdomen soft, non-tender, non-distended, normoactive bowel sounds, no masses  MSK:  No muscle atrophy, cyanosis, peripheral edema, Normal ROM of the spine, no lumbar-coccygeal tenderness, no pain on flexion or extension of thigh b/l, no gait abnormalities.  Skin:  No rashes, ulcers, erythema  Neuro:  CNII-XII grossly intact. No gross motor/sensory deficits in the legs b/l, crepitus+ on flexion of the knees b/l  Psych:  Alert and oriented to person, place, and time    ASSESSMENT & PLAN:     La was seen today for low-back pain.    Diagnoses and all orders for this visit:    Acute midline low back pain without sciatica  - Patient without fever, no focal neurologic deficits, no weight loss no bladder or bowel incontinence. Suspect degenerative joint disease.  - Offered patient healthy back physical therapy, however she declined at this time  - Advised to use heating pad/ cold compress. Patient can also take Ibuprofen or Tylenol for the pain.  - Stretching exercises offered to patient.  - Patient will like to hold off on lumbar xray for now, advised to return to clinic for evaluation if symptoms get worse or fail to improve.        Michel Peterson MD  Internal Medicine " PGY-1    Viviane Penn DO  Internal Medicine PGY-2          Answers for HPI/ROS submitted by the patient on 7/30/2019   Back pain  Chronicity: new  Onset: more than 1 month ago  Frequency: daily  Progression since onset: gradually worsening  Pain location: sacro-iliac  Pain quality: aching  Radiates to: does not radiate  Pain - numeric: 3/10  Pain is: worse during the night  Aggravated by: lying down  Stiffness is present: at night  abdominal pain: No  bladder incontinence: No  bowel incontinence: Yes  chest pain: No  dysuria: No  fever: No  headaches: No  leg pain: No  numbness: No  paresis: No  paresthesias: No  pelvic pain: No  perianal numbness: No  tingling: No  weakness: No  weight loss: No  genital pain: No  hematuria: No  Risk factors: sedentary lifestyle  Pain severity: mild  Treatments tried: nothing

## 2019-08-04 NOTE — PROGRESS NOTES
I have reviewed and concur with the resident's history, physical, assessment, and plan.  I have personally interviewed and examined the patient  La Salgado  at bedside.  See below addendum for my evaluation and additional findings.  No midline spine tenderness but comp fx is possible, offered xray she declines for now.  Explained that if not better in 1-2 weeks, pt should rtc/call PCP      Answers for HPI/ROS submitted by the patient on 7/30/2019   Back pain  Chronicity: new  Onset: more than 1 month ago  Frequency: daily  Progression since onset: gradually worsening  Pain location: sacro-iliac  Pain quality: aching  Radiates to: does not radiate  Pain - numeric: 3/10  Pain is: worse during the night  Aggravated by: lying down  Stiffness is present: at night  abdominal pain: No  bladder incontinence: No  bowel incontinence: Yes  chest pain: No  dysuria: No  fever: No  headaches: No  leg pain: No  numbness: No  paresis: No  paresthesias: No  pelvic pain: No  perianal numbness: No  tingling: No  weakness: No  weight loss: No  genital pain: No  hematuria: No  Risk factors: sedentary lifestyle  Pain severity: mild  Treatments tried: nothing

## 2019-08-09 ENCOUNTER — CLINICAL SUPPORT (OUTPATIENT)
Dept: AUDIOLOGY | Facility: CLINIC | Age: 74
End: 2019-08-09
Payer: COMMERCIAL

## 2019-08-09 ENCOUNTER — CLINICAL SUPPORT (OUTPATIENT)
Dept: AUDIOLOGY | Facility: CLINIC | Age: 74
End: 2019-08-09

## 2019-08-09 DIAGNOSIS — H90.3 SENSORINEURAL HEARING LOSS, BILATERAL: Primary | ICD-10-CM

## 2019-08-09 PROCEDURE — 92557 PR COMPREHENSIVE HEARING TEST: ICD-10-PCS | Mod: S$GLB,,, | Performed by: AUDIOLOGIST-HEARING AID FITTER

## 2019-08-09 PROCEDURE — 92557 COMPREHENSIVE HEARING TEST: CPT | Mod: S$GLB,,, | Performed by: AUDIOLOGIST-HEARING AID FITTER

## 2019-08-09 NOTE — PROGRESS NOTES
La Salgado was seen in the clinic today for a hearing aid consult.    Ms. Salgado came in with OtSlingjot Intiga 10 hearing aids that were donated to her by her sister. The hearing aids were programmed with cables based on the results of today's hearing test. Acclimatization was increased to 3. Ms. Salgado was pleased with how the hearing aids sounded. She was shown how to insert/remove the hearing aids and how to change the batteries. Care and maintenance were also reviewed. We discussed new technology. Ms. Salgado was interested in rechargeable, bluetooth technology. Mid level or premium Phonak Audeo M hearing aids were recommended in silver. Ms. Salgado was informed that if she decided to purchase new hearing aids within 90 days that her $500 transfer of care fee would be applied to the price of the new hearing aids.     A two week follow-up appointment was scheduled. Ms. Salgado was encouraged to call the clinic if she needed to be seen sooner.

## 2019-08-09 NOTE — PROGRESS NOTES
La Salgado was seen in the clinic today for a hearing evaluation. Ms. Salgado reported bilateral hearing loss.      Otoscopy was unremarkable. Audiological testing revealed a mild mid to high frequency sensorineural hearing loss in both ears. A speech reception threshold was obtained at 20 dBHL in both ears. Speech recognition was 92%, bilaterally.    Recommendations:  1. Otologic evaluation  2. Annual hearing evaluation  3. Hearing aid consult

## 2019-10-01 ENCOUNTER — PATIENT MESSAGE (OUTPATIENT)
Dept: INTERNAL MEDICINE | Facility: CLINIC | Age: 74
End: 2019-10-01

## 2019-10-01 ENCOUNTER — IMMUNIZATION (OUTPATIENT)
Dept: PHARMACY | Facility: CLINIC | Age: 74
End: 2019-10-01
Payer: COMMERCIAL

## 2019-11-22 ENCOUNTER — PATIENT MESSAGE (OUTPATIENT)
Dept: INTERNAL MEDICINE | Facility: CLINIC | Age: 74
End: 2019-11-22

## 2019-11-25 ENCOUNTER — PATIENT MESSAGE (OUTPATIENT)
Dept: INTERNAL MEDICINE | Facility: CLINIC | Age: 74
End: 2019-11-25

## 2020-03-27 ENCOUNTER — PATIENT MESSAGE (OUTPATIENT)
Dept: INTERNAL MEDICINE | Facility: CLINIC | Age: 75
End: 2020-03-27

## 2020-03-28 ENCOUNTER — PATIENT MESSAGE (OUTPATIENT)
Dept: INTERNAL MEDICINE | Facility: CLINIC | Age: 75
End: 2020-03-28

## 2020-04-12 DIAGNOSIS — E78.5 HYPERLIPIDEMIA, UNSPECIFIED HYPERLIPIDEMIA TYPE: ICD-10-CM

## 2020-04-13 RX ORDER — ATORVASTATIN CALCIUM 10 MG/1
TABLET, FILM COATED ORAL
Qty: 90 TABLET | Refills: 0 | Status: SHIPPED | OUTPATIENT
Start: 2020-04-13 | End: 2020-05-18

## 2020-04-17 ENCOUNTER — PATIENT MESSAGE (OUTPATIENT)
Dept: PRIMARY CARE CLINIC | Facility: CLINIC | Age: 75
End: 2020-04-17

## 2020-05-10 ENCOUNTER — PATIENT MESSAGE (OUTPATIENT)
Dept: INTERNAL MEDICINE | Facility: CLINIC | Age: 75
End: 2020-05-10

## 2020-05-16 DIAGNOSIS — E78.5 HYPERLIPIDEMIA, UNSPECIFIED HYPERLIPIDEMIA TYPE: ICD-10-CM

## 2020-05-17 ENCOUNTER — PATIENT MESSAGE (OUTPATIENT)
Dept: INTERNAL MEDICINE | Facility: CLINIC | Age: 75
End: 2020-05-17

## 2020-05-18 RX ORDER — ATORVASTATIN CALCIUM 10 MG/1
TABLET, FILM COATED ORAL
Qty: 90 TABLET | Refills: 1 | Status: SHIPPED | OUTPATIENT
Start: 2020-05-18 | End: 2020-11-08

## 2020-06-09 ENCOUNTER — OFFICE VISIT (OUTPATIENT)
Dept: INTERNAL MEDICINE | Facility: CLINIC | Age: 75
End: 2020-06-09
Payer: COMMERCIAL

## 2020-06-09 ENCOUNTER — IMMUNIZATION (OUTPATIENT)
Dept: PHARMACY | Facility: CLINIC | Age: 75
End: 2020-06-09
Payer: COMMERCIAL

## 2020-06-09 ENCOUNTER — HOSPITAL ENCOUNTER (OUTPATIENT)
Dept: RADIOLOGY | Facility: HOSPITAL | Age: 75
Discharge: HOME | End: 2020-06-09
Attending: INTERNAL MEDICINE
Payer: COMMERCIAL

## 2020-06-09 VITALS
WEIGHT: 140 LBS | SYSTOLIC BLOOD PRESSURE: 116 MMHG | BODY MASS INDEX: 21.97 KG/M2 | HEIGHT: 67 IN | HEART RATE: 80 BPM | OXYGEN SATURATION: 99 % | DIASTOLIC BLOOD PRESSURE: 70 MMHG

## 2020-06-09 DIAGNOSIS — E78.5 HYPERLIPIDEMIA, UNSPECIFIED HYPERLIPIDEMIA TYPE: ICD-10-CM

## 2020-06-09 DIAGNOSIS — Z12.31 VISIT FOR SCREENING MAMMOGRAM: ICD-10-CM

## 2020-06-09 DIAGNOSIS — Z00.00 ROUTINE GENERAL MEDICAL EXAMINATION AT A HEALTH CARE FACILITY: Primary | ICD-10-CM

## 2020-06-09 DIAGNOSIS — Z12.31 ENCOUNTER FOR SCREENING MAMMOGRAM FOR MALIGNANT NEOPLASM OF BREAST: ICD-10-CM

## 2020-06-09 PROCEDURE — 99999 PR PBB SHADOW E&M-EST. PATIENT-LVL IV: CPT | Mod: PBBFAC,,, | Performed by: INTERNAL MEDICINE

## 2020-06-09 PROCEDURE — 99397 PER PM REEVAL EST PAT 65+ YR: CPT | Mod: S$GLB,,, | Performed by: INTERNAL MEDICINE

## 2020-06-09 PROCEDURE — 77067 SCR MAMMO BI INCL CAD: CPT | Mod: 26,,, | Performed by: RADIOLOGY

## 2020-06-09 PROCEDURE — 77067 SCR MAMMO BI INCL CAD: CPT | Mod: TC

## 2020-06-09 PROCEDURE — 77063 BREAST TOMOSYNTHESIS BI: CPT | Mod: 26,,, | Performed by: RADIOLOGY

## 2020-06-09 PROCEDURE — 99999 PR PBB SHADOW E&M-EST. PATIENT-LVL IV: ICD-10-PCS | Mod: PBBFAC,,, | Performed by: INTERNAL MEDICINE

## 2020-06-09 PROCEDURE — 99397 PR PREVENTIVE VISIT,EST,65 & OVER: ICD-10-PCS | Mod: S$GLB,,, | Performed by: INTERNAL MEDICINE

## 2020-06-09 PROCEDURE — 77067 MAMMO DIGITAL SCREENING BILAT WITH TOMOSYNTHESIS_CAD: ICD-10-PCS | Mod: 26,,, | Performed by: RADIOLOGY

## 2020-06-09 PROCEDURE — 77063 MAMMO DIGITAL SCREENING BILAT WITH TOMOSYNTHESIS_CAD: ICD-10-PCS | Mod: 26,,, | Performed by: RADIOLOGY

## 2020-06-09 NOTE — PROGRESS NOTES
Subjective:       Patient ID: La Salgado is a 75 y.o. female.    Chief Complaint: Annual Exam    Patient is here for followup for chronic conditions.    Chronic low back pains bother her when laying down. Has been a few yrs. No radicular.  Pt denies f/c/ns/wt loss, no fecal incontinence or difficulty with retained urine, no hematuria, no dysuria, no perianal anesthesia, no focal weakness or loss of sensation in feet or legs.      She was sick early April, she wonders if she had covid19.    Review of Systems   Constitutional: Negative for activity change and unexpected weight change.   HENT: Negative for hearing loss, rhinorrhea and trouble swallowing.    Eyes: Negative for discharge and visual disturbance.   Respiratory: Negative for chest tightness and wheezing.    Cardiovascular: Negative for chest pain and palpitations.   Gastrointestinal: Negative for blood in stool, constipation, diarrhea and vomiting.   Endocrine: Negative for polydipsia and polyuria.   Genitourinary: Negative for difficulty urinating, dysuria, hematuria and menstrual problem.   Musculoskeletal: Positive for arthralgias. Negative for joint swelling and neck pain.   Neurological: Negative for weakness and headaches.   Psychiatric/Behavioral: Negative for confusion and dysphoric mood.       Objective:      Physical Exam   Constitutional: She is oriented to person, place, and time. She appears well-developed and well-nourished. No distress.   HENT:   Head: Normocephalic and atraumatic.   Eyes: Pupils are equal, round, and reactive to light. No scleral icterus.   Neck: Normal range of motion. No thyromegaly present.   Cardiovascular: Normal rate, regular rhythm and normal heart sounds. Exam reveals no gallop and no friction rub.   No murmur heard.  Pulmonary/Chest: Effort normal and breath sounds normal. No respiratory distress. She has no wheezes. She has no rales.   Abdominal: Soft. Bowel sounds are normal. She exhibits no distension and  no mass. There is no tenderness. There is no rebound and no guarding.   Musculoskeletal: Normal range of motion. She exhibits no edema or tenderness.   No midline spine tenderness to deep palpation  nl lower extrem strength/sense/DTRs       Lymphadenopathy:     She has no cervical adenopathy.   Neurological: She is alert and oriented to person, place, and time. She displays normal reflexes. No cranial nerve deficit. She exhibits normal muscle tone. Coordination normal.   Skin: She is not diaphoretic.   Psychiatric: She has a normal mood and affect. Her speech is normal and behavior is normal. Cognition and memory are normal.       Assessment:       1. Routine general medical examination at a health care facility    2. Encounter for screening mammogram for malignant neoplasm of breast    3. Hyperlipidemia, unspecified hyperlipidemia type        Plan:       La was seen today for annual exam.    Diagnoses and all orders for this visit:    Routine general medical examination at a health care facility  -     Ambulatory referral/consult to Optometry; Future  -     CBC auto differential; Future  -     Comprehensive metabolic panel; Future  -     Lipid Panel; Future  -     COVID-19 (SARS CoV-2) IgG Antibody; Future    Encounter for screening mammogram for malignant neoplasm of breast  -     Cancel: Mammo Digital Screening Bilateral With CAD; Future    Hyperlipidemia, unspecified hyperlipidemia type  Remain on statin    Back pains has been chronic and have not progressed and no red flags    Health Maintenance       Date Due Completion Date    Shingles Vaccine (2 of 3) 12/07/2007 10/12/2007    DEXA SCAN 02/09/2021 2/9/2017    Mammogram 04/02/2021 4/2/2019    Colonoscopy 08/19/2021 8/19/2016    Lipid Panel 04/02/2024 4/2/2019    TETANUS VACCINE 01/28/2025 1/28/2015          Follow up in about 1 year (around 6/9/2021) for Shingles vaccine please.    No future appointments.

## 2021-01-09 ENCOUNTER — IMMUNIZATION (OUTPATIENT)
Dept: INTERNAL MEDICINE | Facility: CLINIC | Age: 76
End: 2021-01-09
Payer: COMMERCIAL

## 2021-01-09 DIAGNOSIS — Z23 NEED FOR VACCINATION: ICD-10-CM

## 2021-01-09 PROCEDURE — 91300 COVID-19, MRNA, LNP-S, PF, 30 MCG/0.3 ML DOSE VACCINE: CPT | Mod: PBBFAC | Performed by: FAMILY MEDICINE

## 2021-01-30 ENCOUNTER — IMMUNIZATION (OUTPATIENT)
Dept: INTERNAL MEDICINE | Facility: CLINIC | Age: 76
End: 2021-01-30
Payer: COMMERCIAL

## 2021-01-30 DIAGNOSIS — Z23 NEED FOR VACCINATION: Primary | ICD-10-CM

## 2021-01-30 PROCEDURE — 91300 COVID-19, MRNA, LNP-S, PF, 30 MCG/0.3 ML DOSE VACCINE: CPT | Mod: PBBFAC | Performed by: FAMILY MEDICINE

## 2021-01-30 PROCEDURE — 0002A COVID-19, MRNA, LNP-S, PF, 30 MCG/0.3 ML DOSE VACCINE: CPT | Mod: PBBFAC | Performed by: FAMILY MEDICINE

## 2021-03-18 ENCOUNTER — PATIENT MESSAGE (OUTPATIENT)
Dept: RESEARCH | Facility: HOSPITAL | Age: 76
End: 2021-03-18

## 2021-03-26 ENCOUNTER — PATIENT MESSAGE (OUTPATIENT)
Dept: RESEARCH | Facility: HOSPITAL | Age: 76
End: 2021-03-26

## 2021-07-06 ENCOUNTER — PATIENT MESSAGE (OUTPATIENT)
Dept: ADMINISTRATIVE | Facility: HOSPITAL | Age: 76
End: 2021-07-06

## 2021-10-04 ENCOUNTER — PATIENT MESSAGE (OUTPATIENT)
Dept: ADMINISTRATIVE | Facility: HOSPITAL | Age: 76
End: 2021-10-04

## 2022-03-16 ENCOUNTER — PATIENT MESSAGE (OUTPATIENT)
Dept: ADMINISTRATIVE | Facility: HOSPITAL | Age: 77
End: 2022-03-16
Payer: COMMERCIAL

## 2022-05-12 ENCOUNTER — PATIENT OUTREACH (OUTPATIENT)
Dept: ADMINISTRATIVE | Facility: HOSPITAL | Age: 77
End: 2022-05-12
Payer: COMMERCIAL

## 2022-05-12 NOTE — PROGRESS NOTES
Health Maintenance Due   Topic Date Due    DEXA Scan  02/09/2019    Shingles Vaccine (3 of 3) 08/04/2020    COVID-19 Vaccine (3 - Booster for Pfizer series) 06/30/2021    Colorectal Cancer Screening  08/19/2021     Triggered LINKS & reconciled immunizations. Unable to update Care Everywhere. Checked for outside lab results in [facility]; no results found. I called and spoke to pt about scheduling annual PCP visit with Dr. Flowers. Pt stated she moved out of state and has been receiving care where she is living. Pt stated she would like to re-establish care if she moves back to [place]. FYI flag has been placed on chart stating pt has moved out of state. Care Teams has been updated. Chart review completed.